# Patient Record
Sex: FEMALE | Race: WHITE | Employment: OTHER | URBAN - NONMETROPOLITAN AREA
[De-identification: names, ages, dates, MRNs, and addresses within clinical notes are randomized per-mention and may not be internally consistent; named-entity substitution may affect disease eponyms.]

---

## 2018-03-29 ENCOUNTER — HOSPITAL ENCOUNTER (OUTPATIENT)
Dept: MRI IMAGING | Age: 73
Discharge: HOME | End: 2018-03-29
Payer: MEDICARE

## 2018-03-29 DIAGNOSIS — R51.9 NONINTRACTABLE HEADACHE, UNSPECIFIED CHRONICITY PATTERN, UNSPECIFIED HEADACHE TYPE: ICD-10-CM

## 2018-03-29 PROCEDURE — 70551 MRI BRAIN STEM W/O DYE: CPT

## 2020-07-21 ENCOUNTER — HOSPITAL ENCOUNTER (OUTPATIENT)
Age: 75
Discharge: HOME | End: 2020-07-21
Payer: MEDICARE

## 2020-07-21 PROCEDURE — 36415 COLL VENOUS BLD VENIPUNCTURE: CPT

## 2020-07-21 PROCEDURE — 86769 SARS-COV-2 COVID-19 ANTIBODY: CPT

## 2020-07-21 PROCEDURE — U0003 INFECTIOUS AGENT DETECTION BY NUCLEIC ACID (DNA OR RNA); SEVERE ACUTE RESPIRATORY SYNDROME CORONAVIRUS 2 (SARS-COV-2) (CORONAVIRUS DISEASE [COVID-19]), AMPLIFIED PROBE TECHNIQUE, MAKING USE OF HIGH THROUGHPUT TECHNOLOGIES AS DESCRIBED BY CMS-2020-01-R: HCPCS

## 2020-07-21 PROCEDURE — 99211 OFF/OP EST MAY X REQ PHY/QHP: CPT

## 2020-07-23 LAB — SARS-COV-2, IGG: NEGATIVE

## 2020-07-24 LAB — SARS-COV-2: NOT DETECTED

## 2020-10-06 ENCOUNTER — HOSPITAL ENCOUNTER (OUTPATIENT)
Dept: MRI IMAGING | Age: 75
Discharge: HOME | End: 2020-10-06
Payer: MEDICARE

## 2020-10-06 LAB — POC CREATININE WHOLE BLOOD: 0.7 MG/DL (ref 0.5–1.2)

## 2020-10-06 PROCEDURE — 72157 MRI CHEST SPINE W/O & W/DYE: CPT

## 2020-10-06 PROCEDURE — A9579 GAD-BASE MR CONTRAST NOS,1ML: HCPCS | Performed by: NEUROLOGICAL SURGERY

## 2020-10-06 PROCEDURE — 6360000004 HC RX CONTRAST MEDICATION: Performed by: NEUROLOGICAL SURGERY

## 2020-10-06 PROCEDURE — 82565 ASSAY OF CREATININE: CPT

## 2020-10-06 PROCEDURE — 72156 MRI NECK SPINE W/O & W/DYE: CPT

## 2020-10-06 RX ADMIN — GADOTERIDOL 15 ML: 279.3 INJECTION, SOLUTION INTRAVENOUS at 13:42

## 2021-01-17 ENCOUNTER — HOSPITAL ENCOUNTER (EMERGENCY)
Age: 76
Discharge: HOME | End: 2021-01-17
Payer: MEDICARE

## 2021-01-17 VITALS
DIASTOLIC BLOOD PRESSURE: 69 MMHG | RESPIRATION RATE: 16 BRPM | WEIGHT: 165 LBS | HEIGHT: 62 IN | SYSTOLIC BLOOD PRESSURE: 144 MMHG | OXYGEN SATURATION: 95 % | BODY MASS INDEX: 30.36 KG/M2 | TEMPERATURE: 97.6 F | HEART RATE: 86 BPM

## 2021-01-17 DIAGNOSIS — J06.9 VIRAL URI: Primary | ICD-10-CM

## 2021-01-17 PROCEDURE — 99213 OFFICE O/P EST LOW 20 MIN: CPT

## 2021-01-17 PROCEDURE — U0003 INFECTIOUS AGENT DETECTION BY NUCLEIC ACID (DNA OR RNA); SEVERE ACUTE RESPIRATORY SYNDROME CORONAVIRUS 2 (SARS-COV-2) (CORONAVIRUS DISEASE [COVID-19]), AMPLIFIED PROBE TECHNIQUE, MAKING USE OF HIGH THROUGHPUT TECHNOLOGIES AS DESCRIBED BY CMS-2020-01-R: HCPCS

## 2021-01-17 PROCEDURE — U0002 COVID-19 LAB TEST NON-CDC: HCPCS

## 2021-01-17 RX ORDER — CALCIUM POLYCARBOPHIL 625 MG 625 MG/1
625 TABLET ORAL DAILY
COMMUNITY

## 2021-01-17 RX ORDER — ONDANSETRON 4 MG/1
4 TABLET, ORALLY DISINTEGRATING ORAL EVERY 8 HOURS PRN
Qty: 21 TABLET | Refills: 0 | Status: SHIPPED | OUTPATIENT
Start: 2021-01-17

## 2021-01-17 NOTE — ED PROVIDER NOTES
[address]       Chief Complaint   Patient presents with    Covid Testing    Nausea    Fever     99.9    Fatigue       Nurses Notes reviewed and I agree except as noted in the HPI. HISTORY OF PRESENT ILLNESS   Gene Wilde is a 77 y.o. female who presents The history is provided by the patient. URI  Presenting symptoms: congestion, cough, fatigue, fever and sore throat    Congestion:     Location:  Chest    Interferes with sleep: yes      Interferes with eating/drinking: yes    Cough:     Cough characteristics:  Non-productive, barking, hacking and harsh    Sputum characteristics:  Nondescript    Severity:  Moderate    Onset quality:  Sudden    Timing:  Intermittent    Progression:  Waxing and waning  Fatigue:     Severity:  Moderate    Timing:  Constant    Progression:  Worsening  Fever:     Duration:  1 day    Timing:  Intermittent    Max temp prior to arrival:  99.8    Temp source:  Subjective and oral  Severity:  Moderate  Onset quality:  Sudden  Timing:  Intermittent  Progression:  Worsening  Chronicity:  New  Relieved by:  Nothing  Worsened by:  Drinking, eating and movement  Ineffective treatments:  Rest, OTC medications and drinking  Associated symptoms: headaches, myalgias and sinus pain    Risk factors: being elderly, chronic respiratory disease, recent illness and sick contacts          REVIEW OF SYSTEMS     Review of Systems   Constitutional: Positive for activity change, appetite change, fatigue and fever. HENT: Positive for congestion, sinus pressure, sinus pain and sore throat. Eyes: Negative. Respiratory: Positive for cough and chest tightness. Cardiovascular: Negative. Gastrointestinal: Negative. Endocrine: Negative for cold intolerance and heat intolerance. Genitourinary: Negative. Musculoskeletal: Positive for back pain and myalgias. Allergic/Immunologic: Negative.     Neurological: Positive for headaches. Hematological: Negative for adenopathy. Does not bruise/bleed easily. Psychiatric/Behavioral: Negative. PAST MEDICAL HISTORY         Diagnosis Date    [facility])     skin    Cerebral artery occlusion with cerebral infarction ([facility] [address]     GERD (gastroesophageal reflux disease)     Hypertension     Irritable bowel disease     PONV (postoperative nausea and vomiting)     nausea       SURGICAL HISTORY     Patient  has a past surgical history that includes cervical fusion (2000); Colonoscopy (2013); Hemorrhoid surgery (1995?? ); Pilonidal cyst excision (1965); Tonsillectomy; eye surgery (2011); skin biopsy; Dilation and curettage of uterus; Tympanostomy tube placement (Left); and LEEP (04/20/2015). CURRENT MEDICATIONS       Discharge Medication List as of 1/17/2021  4:47 PM      CONTINUE these medications which have NOT CHANGED    Details   Ezetimibe (ZETIA PO) Take by mouthHistorical Med      polycarbophil (FIBERCON) 625 MG tablet Take 625 mg by mouth dailyHistorical Med      Rosuvastatin Calcium (CRESTOR PO) Take by mouthHistorical Med      METFORMIN HCL PO Take by mouthHistorical Med      vitamin D (CHOLECALCIFEROL) 1000 UNIT TABS tablet Take 1,000 Units by mouth dailyHistorical Med      folic acid (FOLVITE) 1 MG tablet Take 1 mg by mouth dailyHistorical Med      losartan (COZAAR) 100 MG tablet Take 100 mg by mouth dailyHistorical Med      loperamide (IMODIUM) 2 MG capsule Take 2 mg by mouth 4 times daily as needed for DiarrheaHistorical Med      hydrochlorothiazide (HYDRODIURIL) 25 MG tablet Take 25 mg by mouth daily. RABEprazole (ACIPHEX) 20 MG tablet Take 20 mg by mouth daily. ALPRAZolam (XANAX) 0.25 MG tablet Take 0.25 mg by mouth nightly as needed for Sleep. ALLERGIES     Patient is is allergic to pcn [penicillins] and versed [midazolam]. FAMILY HISTORY     Patient'sfamily history is not on file.     SOCIAL HISTORY     Patient  reports that she has never smoked. She has never used smokeless tobacco. She reports current alcohol use. She reports that she does not use drugs. PHYSICAL EXAM     ED TRIAGE VITALS  BP: (!) 144/69, Temp: 97.6 °F (36.4 °C), Pulse: 86, Resp: 16, SpO2: 95 %  Physical Exam  Vitals signs and nursing note reviewed. Constitutional:       Appearance: She is obese. She is ill-appearing. HENT:      Head: Normocephalic. Right Ear: Tympanic membrane, ear canal and external ear normal.      Left Ear: Tympanic membrane, ear canal and external ear normal.      Nose: Nose normal.      Mouth/Throat:      Mouth: Mucous membranes are dry. Pharynx: Posterior oropharyngeal erythema present. Eyes:      Conjunctiva/sclera: Conjunctivae normal.   Neck:      Musculoskeletal: Normal range of motion and neck supple. Muscular tenderness present. Cardiovascular:      Rate and Rhythm: Normal rate and regular rhythm. Heart sounds: Normal heart sounds. Pulmonary:      Breath sounds: Wheezing (diffuse throughout) present. No rhonchi. Abdominal:      General: Abdomen is flat. Bowel sounds are normal.   Musculoskeletal: Normal range of motion. Lymphadenopathy:      Cervical: No cervical adenopathy. Skin:     General: Skin is warm and dry. Capillary Refill: Capillary refill takes 2 to 3 seconds. Coloration: Skin is pale. Neurological:      General: No focal deficit present. Mental Status: She is alert and oriented to person, place, and time. Mental status is at baseline. Psychiatric:         Mood and Affect: Mood normal.         Behavior: Behavior normal.         Thought Content: Thought content normal.         DIAGNOSTIC RESULTS   Labs: No results found for this visit on 01/17/21.     IMAGING:  No orders to display     URGENT CARE COURSE:     Vitals:    01/17/21 1630   BP: (!) 144/69   Pulse: 86   Resp: 16   Temp: 97.6 °F (36.4 °C)   TempSrc: Temporal   SpO2: 95%   Weight: 165 lb (74.8 kg)   Height: 5' 2\" (1.575 m) Medications - No data to display  PROCEDURES:  None  FINALIMPRESSION    I have reviewed the patient's medical history in detail and updated the computerized patient record. HPI/ROS per the patient and caregiver. Overall non toxic in appearance. Answers questions appropriately. Conditions discussed and addressed this visit include:     Symptoms consistent with a viral URI. Does have a known close contact positive for covid in the last 5 days. Steps to help prevent the spread of COVID-19 if you are sick  SOURCE - [email or web address]/. html     Stay home except to get medical care   ; Stay home: People who are mildly ill with COVID-19 are able to isolate at home during their illness. You should restrict activities outside your home, except for getting medical care.   ; Avoid public areas: Do not go to work, school, or public areas.   ; Avoid public transportation: Avoid using public transportation, ride-sharing, or taxis.  ; Separate yourself from other people and animals in your home   ; Stay away from others: As much as possible, you should stay in a specific room and away from other people in your home. Also, you should use a separate bathroom, if available.   ; Limit contact with pets & animals: You should restrict contact with pets and other animals while you are sick with COVID-19, just like you would around other people. Although there have not been reports of pets or other animals becoming sick with COVID-19, it is still recommended that people sick with COVID-19 limit contact with animals until more information is known about the virus. ; When possible, have another member of your household care for your animals while you are sick. If you are sick with COVID-19, avoid contact with your pet, including petting, snuggling, being kissed or licked, and sharing food.  If you must care for your pet or be around animals while you are sick, wash your hands before product, such as wearing gloves and making sure you have good ventilation during use of the product. Monitor your symptoms   Seek medical attention: Seek prompt medical attention if your illness is worsening     (e.g., difficulty breathing).   ; Call your doctor: Before seeking care, call your healthcare provider and tell them that you have, or are being evaluated for, COVID-19.   ; Wear a facemask when sick: Put on a facemask before you enter the facility. These steps will help the healthcare provider's office to keep other people in the office or waiting room from getting infected or exposed. ; Alert health department: Ask your healthcare provider to call the local or [facility] health department. Persons who are placed under active monitoring or facilitated self-monitoring should follow instructions provided by their local health department or occupational health professionals, as appropriate.  ; Call 911 if you have a medical emergency: If you have a medical emergency and need to call 911, notify the dispatch personnel that you have, or are being evaluated for COVID-19. If possible, put on a facemask before emergency medical services arrive.       1. Viral URI        DISPOSITION/PLAN   DISPOSITION      PATIENT REFERRED TO:  Celine Soto MD  [address] [place] [address]  [phone]    In 3 days  As needed, If symptoms worsen    Celine Soto MD  [address] [place] [address]  [phone]    In 3 days  As needed, If symptoms worsen    DISCHARGE MEDICATIONS:  Discharge Medication List as of 1/17/2021  4:47 PM      START taking these medications    Details   ondansetron (ZOFRAN ODT) 4 MG disintegrating tablet Take 1 tablet by mouth every 8 hours as needed for Nausea or Vomiting, Disp-21 tablet, R-0Normal           Discharge Medication List as of 1/17/2021  4:47 PM          DINA Arias - CNP

## 2021-01-17 NOTE — ED NOTES
To [facility] with complaints of needing tested for covid. States her daughter recently tested positive and she was around her last Saturday. Symptoms of fatigue, fever up to 99.9, nausea.  Had diarrhea last Monday but none since     [facility]  01/17/21 0161

## 2021-01-18 ENCOUNTER — CARE COORDINATION (OUTPATIENT)
Dept: CARE COORDINATION | Age: 76
End: 2021-01-18

## 2021-01-18 ASSESSMENT — ENCOUNTER SYMPTOMS
EYES NEGATIVE: 1
SINUS PRESSURE: 1
ALLERGIC/IMMUNOLOGIC NEGATIVE: 1
SINUS PAIN: 1
GASTROINTESTINAL NEGATIVE: 1
SORE THROAT: 1
COUGH: 1
CHEST TIGHTNESS: 1
BACK PAIN: 1

## 2021-01-18 NOTE — CARE COORDINATION
Called unable to leave message due to mailbox being full. Will Attempt second call This after noon. Return call from patient     Patient contacted regarding Lois Primer. Discussed COVID-19 related testing which was pending at this time. Test results were pending. Patient informed of results, if available? Yes    Care Transition Nurse/ Ambulatory Care Manager contacted the parent by telephone to perform post discharge assessment. Call within 2 business days of discharge: Yes. Verified name and  with parent as identifiers. Provided introduction to self, and explanation of the CTN/ACM role, and reason for call due to risk factors for infection and/or exposure to COVID-19. Symptoms reviewed with parent who verbalized the following symptoms: fever, fatigue, pain or aching joints, cough, nausea and sore throat. Due to no new or worsening symptoms encounter was not routed to provider for escalation. Discussed follow-up appointments. If no appointment was previously scheduled, appointment scheduling offered: Yes. Patient will call PCP and make appointment  [facility] follow up appointment(s): No future appointments. Non-[facility] follow up appointment(s):     Non-face-to-face services provided:  Obtained and reviewed discharge summary and/or continuity of care documents  Reviewed and followed up on pending diagnostic tests and treatments- COVID Pending  Education of patient/family/caregiver/guardian to support self-management- Isolation precautions     Advance Care Planning:   Does patient have an Advance Directive:  not on file; education provided. Patient has following risk factors of: no known risk factors. CTN/ACM reviewed discharge instructions, medical action plan and red flags such as increased shortness of breath, increasing fever and signs of decompensation with parent who verbalized understanding.    Discussed exposure protocols and quarantine with CDC Guidelines What to do if you are sick with

## 2021-01-19 LAB
PERFORMING LAB: NORMAL
REPORT: NORMAL
SARS-COV-2: NOT DETECTED

## 2025-04-18 ENCOUNTER — HOSPITAL ENCOUNTER (INPATIENT)
Age: 80
LOS: 1 days | Discharge: HOME | DRG: 322 | End: 2025-04-19
Attending: EMERGENCY MEDICINE | Admitting: STUDENT IN AN ORGANIZED HEALTH CARE EDUCATION/TRAINING PROGRAM
Payer: MEDICARE

## 2025-04-18 ENCOUNTER — APPOINTMENT (OUTPATIENT)
Dept: CT IMAGING | Age: 80
DRG: 322 | End: 2025-04-18
Payer: MEDICARE

## 2025-04-18 DIAGNOSIS — I16.0 HYPERTENSIVE URGENCY: ICD-10-CM

## 2025-04-18 DIAGNOSIS — R79.89 ELEVATED TROPONIN: ICD-10-CM

## 2025-04-18 DIAGNOSIS — I21.4 NSTEMI (NON-ST ELEVATED MYOCARDIAL INFARCTION) (HCC): ICD-10-CM

## 2025-04-18 DIAGNOSIS — I21.3 STEMI (ST ELEVATION MYOCARDIAL INFARCTION) (HCC): ICD-10-CM

## 2025-04-18 DIAGNOSIS — Z86.73 HX OF COMPLETED STROKE: Primary | ICD-10-CM

## 2025-04-18 PROBLEM — K52.1 TOXIC GASTROENTERITIS AND COLITIS: Status: ACTIVE | Noted: 2021-04-08

## 2025-04-18 PROBLEM — E78.5 HLD (HYPERLIPIDEMIA): Status: ACTIVE | Noted: 2025-04-18

## 2025-04-18 PROBLEM — N39.0 URINARY TRACT INFECTION: Status: RESOLVED | Noted: 2025-04-18 | Resolved: 2025-04-18

## 2025-04-18 PROBLEM — E66.811 OBESITY (BMI 30.0-34.9): Status: ACTIVE | Noted: 2020-11-17

## 2025-04-18 PROBLEM — M85.80 OSTEOPENIA: Status: RESOLVED | Noted: 2025-04-18 | Resolved: 2025-04-18

## 2025-04-18 PROBLEM — E11.9 TYPE 2 DIABETES MELLITUS WITHOUT COMPLICATIONS (HCC): Status: ACTIVE | Noted: 2020-12-03

## 2025-04-18 PROBLEM — I63.9 CEREBROVASCULAR ACCIDENT (CVA) (HCC): Status: RESOLVED | Noted: 2020-12-22 | Resolved: 2025-04-18

## 2025-04-18 PROBLEM — I25.2 HISTORY OF NON-ST ELEVATION MYOCARDIAL INFARCTION (NSTEMI): Status: ACTIVE | Noted: 2025-04-18

## 2025-04-18 PROBLEM — F41.1 GENERALIZED ANXIETY DISORDER: Status: ACTIVE | Noted: 2024-04-03

## 2025-04-18 PROBLEM — N89.1 VAIN II (VAGINAL INTRAEPITHELIAL NEOPLASIA GRADE II): Status: RESOLVED | Noted: 2018-07-19 | Resolved: 2025-04-18

## 2025-04-18 PROBLEM — I10 HYPERTENSION: Status: ACTIVE | Noted: 2021-04-08

## 2025-04-18 PROBLEM — Z87.898 HISTORY OF BRADYCARDIA: Status: ACTIVE | Noted: 2025-04-18

## 2025-04-18 LAB
ACTIVATED CLOTTING TIME: 354 SECONDS (ref 1–150)
ALBUMIN SERPL BCG-MCNC: 4 G/DL (ref 3.4–4.9)
ALP SERPL-CCNC: 58 U/L (ref 38–126)
ALT SERPL W/O P-5'-P-CCNC: 37 U/L (ref 10–35)
ANION GAP SERPL CALC-SCNC: 14 MEQ/L (ref 8–16)
APTT PPP: 36.7 SECONDS (ref 22–38)
APTT PPP: 37.9 SECONDS (ref 22–38)
AST SERPL-CCNC: 43 U/L (ref 10–35)
BASOPHILS ABSOLUTE: 0 THOU/MM3 (ref 0–0.1)
BASOPHILS NFR BLD AUTO: 0.2 %
BILIRUB SERPL-MCNC: 0.9 MG/DL (ref 0.3–1.2)
BUN SERPL-MCNC: 14 MG/DL (ref 8–23)
CALCIUM SERPL-MCNC: 9.5 MG/DL (ref 8.8–10.2)
CHLORIDE SERPL-SCNC: 102 MEQ/L (ref 98–111)
CK SERPL-CCNC: 126 U/L (ref 26–192)
CO2 SERPL-SCNC: 21 MEQ/L (ref 22–29)
CREAT SERPL-MCNC: 0.8 MG/DL (ref 0.5–0.9)
DEPRECATED MEAN GLUCOSE BLD GHB EST-ACNC: 141 MG/DL (ref 70–126)
DEPRECATED RDW RBC AUTO: 44.2 FL (ref 35–45)
ECHO BSA: 1.8 M2
EKG ATRIAL RATE: 70 BPM
EKG ATRIAL RATE: 75 BPM
EKG ATRIAL RATE: 83 BPM
EKG P AXIS: 32 DEGREES
EKG P AXIS: 47 DEGREES
EKG P-R INTERVAL: 116 MS
EKG P-R INTERVAL: 142 MS
EKG P-R INTERVAL: 144 MS
EKG Q-T INTERVAL: 406 MS
EKG Q-T INTERVAL: 478 MS
EKG Q-T INTERVAL: 516 MS
EKG QRS DURATION: 138 MS
EKG QRS DURATION: 140 MS
EKG QRS DURATION: 146 MS
EKG QTC CALCULATION (BAZETT): 502 MS
EKG QTC CALCULATION (BAZETT): 533 MS
EKG QTC CALCULATION (BAZETT): 557 MS
EKG R AXIS: -41 DEGREES
EKG R AXIS: -55 DEGREES
EKG R AXIS: -58 DEGREES
EKG T AXIS: -36 DEGREES
EKG T AXIS: -7 DEGREES
EKG T AXIS: 53 DEGREES
EKG VENTRICULAR RATE: 70 BPM
EKG VENTRICULAR RATE: 75 BPM
EKG VENTRICULAR RATE: 92 BPM
EOSINOPHIL NFR BLD AUTO: 1.3 %
EOSINOPHILS ABSOLUTE: 0.1 THOU/MM3 (ref 0–0.4)
ERYTHROCYTE [DISTWIDTH] IN BLOOD BY AUTOMATED COUNT: 13.4 % (ref 11.5–14.5)
GFR SERPL CREATININE-BSD FRML MDRD: 75 ML/MIN/1.73M2
GLUCOSE BLD STRIP.AUTO-MCNC: 152 MG/DL (ref 70–108)
GLUCOSE SERPL-MCNC: 135 MG/DL (ref 74–109)
HBA1C MFR BLD HPLC: 6.7 % (ref 4–6)
HCT VFR BLD AUTO: 44.9 % (ref 37–47)
HEPARIN UNFRACTIONATED: 0.04 U/ML (ref 0.3–0.7)
HGB BLD-MCNC: 15.6 GM/DL (ref 12–16)
IMM GRANULOCYTES # BLD AUTO: 0.02 THOU/MM3 (ref 0–0.07)
IMM GRANULOCYTES NFR BLD AUTO: 0.2 %
INR PPP: 1.42 (ref 0.85–1.13)
INR PPP: 1.43 (ref 0.85–1.13)
LIPASE SERPL-CCNC: 31 U/L (ref 13–60)
LYMPHOCYTES ABSOLUTE: 2.5 THOU/MM3 (ref 1–4.8)
LYMPHOCYTES NFR BLD AUTO: 25.3 %
MAGNESIUM SERPL-MCNC: 1.8 MG/DL (ref 1.6–2.6)
MCH RBC QN AUTO: 31.4 PG (ref 26–33)
MCHC RBC AUTO-ENTMCNC: 34.7 GM/DL (ref 32.2–35.5)
MCV RBC AUTO: 90.3 FL (ref 81–99)
MONOCYTES ABSOLUTE: 0.7 THOU/MM3 (ref 0.4–1.3)
MONOCYTES NFR BLD AUTO: 7.2 %
NEUTROPHILS ABSOLUTE: 6.4 THOU/MM3 (ref 1.8–7.7)
NEUTROPHILS NFR BLD AUTO: 65.8 %
NRBC BLD AUTO-RTO: 0 /100 WBC
NT-PROBNP SERPL IA-MCNC: 131 PG/ML (ref 0–449)
OSMOLALITY SERPL CALC.SUM OF ELEC: 276.3 MOSMOL/KG (ref 275–300)
PLATELET # BLD AUTO: 169 THOU/MM3 (ref 130–400)
PMV BLD AUTO: 10.4 FL (ref 9.4–12.4)
POTASSIUM SERPL-SCNC: 3.8 MEQ/L (ref 3.5–5.2)
PROT SERPL-MCNC: 7.2 G/DL (ref 6.4–8.3)
RBC # BLD AUTO: 4.97 MILL/MM3 (ref 4.2–5.4)
REASON FOR REJECTION: NORMAL
REJECTED TEST: NORMAL
SODIUM SERPL-SCNC: 137 MEQ/L (ref 135–145)
TROPONIN, HIGH SENSITIVITY: 34 NG/L (ref 0–12)
TROPONIN, HIGH SENSITIVITY: 548 NG/L (ref 0–12)
TROPONIN, HIGH SENSITIVITY: 833 NG/L (ref 0–12)
TSH SERPL DL<=0.05 MIU/L-ACNC: 1.98 UIU/ML (ref 0.27–4.2)
WBC # BLD AUTO: 9.8 THOU/MM3 (ref 4.8–10.8)

## 2025-04-18 PROCEDURE — 99223 1ST HOSP IP/OBS HIGH 75: CPT

## 2025-04-18 PROCEDURE — 6360000004 HC RX CONTRAST MEDICATION: Performed by: EMERGENCY MEDICINE

## 2025-04-18 PROCEDURE — 6370000000 HC RX 637 (ALT 250 FOR IP): Performed by: INTERNAL MEDICINE

## 2025-04-18 PROCEDURE — 93005 ELECTROCARDIOGRAM TRACING: CPT | Performed by: INTERNAL MEDICINE

## 2025-04-18 PROCEDURE — 6370000000 HC RX 637 (ALT 250 FOR IP)

## 2025-04-18 PROCEDURE — 93458 L HRT ARTERY/VENTRICLE ANGIO: CPT | Performed by: INTERNAL MEDICINE

## 2025-04-18 PROCEDURE — 83880 ASSAY OF NATRIURETIC PEPTIDE: CPT

## 2025-04-18 PROCEDURE — 99152 MOD SED SAME PHYS/QHP 5/>YRS: CPT | Performed by: INTERNAL MEDICINE

## 2025-04-18 PROCEDURE — 6360000002 HC RX W HCPCS: Performed by: EMERGENCY MEDICINE

## 2025-04-18 PROCEDURE — C9600 PERC DRUG-EL COR STENT SING: HCPCS | Performed by: INTERNAL MEDICINE

## 2025-04-18 PROCEDURE — B2161ZZ FLUOROSCOPY OF RIGHT AND LEFT HEART USING LOW OSMOLAR CONTRAST: ICD-10-PCS | Performed by: INTERNAL MEDICINE

## 2025-04-18 PROCEDURE — 99285 EMERGENCY DEPT VISIT HI MDM: CPT

## 2025-04-18 PROCEDURE — 6360000002 HC RX W HCPCS

## 2025-04-18 PROCEDURE — 96365 THER/PROPH/DIAG IV INF INIT: CPT

## 2025-04-18 PROCEDURE — B3101ZZ FLUOROSCOPY OF THORACIC AORTA USING LOW OSMOLAR CONTRAST: ICD-10-PCS | Performed by: INTERNAL MEDICINE

## 2025-04-18 PROCEDURE — G0378 HOSPITAL OBSERVATION PER HR: HCPCS

## 2025-04-18 PROCEDURE — C1894 INTRO/SHEATH, NON-LASER: HCPCS | Performed by: INTERNAL MEDICINE

## 2025-04-18 PROCEDURE — 2709999900 HC NON-CHARGEABLE SUPPLY: Performed by: INTERNAL MEDICINE

## 2025-04-18 PROCEDURE — 92921 HC PRQ CARDIAC ANGIO ADDL ART: CPT | Performed by: INTERNAL MEDICINE

## 2025-04-18 PROCEDURE — 85025 COMPLETE CBC W/AUTO DIFF WBC: CPT

## 2025-04-18 PROCEDURE — 36415 COLL VENOUS BLD VENIPUNCTURE: CPT

## 2025-04-18 PROCEDURE — 82948 REAGENT STRIP/BLOOD GLUCOSE: CPT

## 2025-04-18 PROCEDURE — 85610 PROTHROMBIN TIME: CPT

## 2025-04-18 PROCEDURE — 83874 ASSAY OF MYOGLOBIN: CPT

## 2025-04-18 PROCEDURE — 84443 ASSAY THYROID STIM HORMONE: CPT

## 2025-04-18 PROCEDURE — 99153 MOD SED SAME PHYS/QHP EA: CPT | Performed by: INTERNAL MEDICINE

## 2025-04-18 PROCEDURE — 92978 ENDOLUMINL IVUS OCT C 1ST: CPT | Performed by: INTERNAL MEDICINE

## 2025-04-18 PROCEDURE — 027034Z DILATION OF CORONARY ARTERY, ONE ARTERY WITH DRUG-ELUTING INTRALUMINAL DEVICE, PERCUTANEOUS APPROACH: ICD-10-PCS | Performed by: INTERNAL MEDICINE

## 2025-04-18 PROCEDURE — 71275 CT ANGIOGRAPHY CHEST: CPT

## 2025-04-18 PROCEDURE — 4A023N7 MEASUREMENT OF CARDIAC SAMPLING AND PRESSURE, LEFT HEART, PERCUTANEOUS APPROACH: ICD-10-PCS | Performed by: INTERNAL MEDICINE

## 2025-04-18 PROCEDURE — 83036 HEMOGLOBIN GLYCOSYLATED A1C: CPT

## 2025-04-18 PROCEDURE — C1725 CATH, TRANSLUMIN NON-LASER: HCPCS | Performed by: INTERNAL MEDICINE

## 2025-04-18 PROCEDURE — 85730 THROMBOPLASTIN TIME PARTIAL: CPT

## 2025-04-18 PROCEDURE — 2500000003 HC RX 250 WO HCPCS: Performed by: INTERNAL MEDICINE

## 2025-04-18 PROCEDURE — 82550 ASSAY OF CK (CPK): CPT

## 2025-04-18 PROCEDURE — 84484 ASSAY OF TROPONIN QUANT: CPT

## 2025-04-18 PROCEDURE — 93005 ELECTROCARDIOGRAM TRACING: CPT | Performed by: EMERGENCY MEDICINE

## 2025-04-18 PROCEDURE — 85520 HEPARIN ASSAY: CPT

## 2025-04-18 PROCEDURE — 83735 ASSAY OF MAGNESIUM: CPT

## 2025-04-18 PROCEDURE — 6360000004 HC RX CONTRAST MEDICATION: Performed by: INTERNAL MEDICINE

## 2025-04-18 PROCEDURE — 70450 CT HEAD/BRAIN W/O DYE: CPT

## 2025-04-18 PROCEDURE — 93005 ELECTROCARDIOGRAM TRACING: CPT

## 2025-04-18 PROCEDURE — 85347 COAGULATION TIME ACTIVATED: CPT

## 2025-04-18 PROCEDURE — C1874 STENT, COATED/COV W/DEL SYS: HCPCS | Performed by: INTERNAL MEDICINE

## 2025-04-18 PROCEDURE — B2151ZZ FLUOROSCOPY OF LEFT HEART USING LOW OSMOLAR CONTRAST: ICD-10-PCS | Performed by: INTERNAL MEDICINE

## 2025-04-18 PROCEDURE — 96366 THER/PROPH/DIAG IV INF ADDON: CPT

## 2025-04-18 PROCEDURE — 93567 NJX CAR CTH SPRVLV AORTGRPHY: CPT | Performed by: INTERNAL MEDICINE

## 2025-04-18 PROCEDURE — C1887 CATHETER, GUIDING: HCPCS | Performed by: INTERNAL MEDICINE

## 2025-04-18 PROCEDURE — 83690 ASSAY OF LIPASE: CPT

## 2025-04-18 PROCEDURE — C1769 GUIDE WIRE: HCPCS | Performed by: INTERNAL MEDICINE

## 2025-04-18 PROCEDURE — 80053 COMPREHEN METABOLIC PANEL: CPT

## 2025-04-18 PROCEDURE — C1753 CATH, INTRAVAS ULTRASOUND: HCPCS | Performed by: INTERNAL MEDICINE

## 2025-04-18 PROCEDURE — 6360000002 HC RX W HCPCS: Performed by: INTERNAL MEDICINE

## 2025-04-18 DEVICE — STENT CORONARY ONYX FRONTIER RX 3.5X15 MM ZOTAROLIMUS ELUT: Type: IMPLANTABLE DEVICE | Status: FUNCTIONAL

## 2025-04-18 RX ORDER — DULAGLUTIDE 0.75 MG/.5ML
3 INJECTION, SOLUTION SUBCUTANEOUS WEEKLY
Status: ON HOLD | COMMUNITY
End: 2025-04-19 | Stop reason: HOSPADM

## 2025-04-18 RX ORDER — ONDANSETRON 4 MG/1
4 TABLET, ORALLY DISINTEGRATING ORAL EVERY 8 HOURS PRN
Status: DISCONTINUED | OUTPATIENT
Start: 2025-04-18 | End: 2025-04-18

## 2025-04-18 RX ORDER — ALPRAZOLAM 0.25 MG
0.25 TABLET ORAL 2 TIMES DAILY PRN
Status: DISCONTINUED | OUTPATIENT
Start: 2025-04-18 | End: 2025-04-19 | Stop reason: HOSPADM

## 2025-04-18 RX ORDER — ASPIRIN 81 MG/1
81 TABLET, CHEWABLE ORAL DAILY
Status: DISCONTINUED | OUTPATIENT
Start: 2025-04-19 | End: 2025-04-18

## 2025-04-18 RX ORDER — ONDANSETRON 2 MG/ML
4 INJECTION INTRAMUSCULAR; INTRAVENOUS EVERY 6 HOURS PRN
Status: DISCONTINUED | OUTPATIENT
Start: 2025-04-18 | End: 2025-04-19 | Stop reason: HOSPADM

## 2025-04-18 RX ORDER — POLYETHYLENE GLYCOL 3350 17 G/17G
17 POWDER, FOR SOLUTION ORAL DAILY PRN
Status: DISCONTINUED | OUTPATIENT
Start: 2025-04-18 | End: 2025-04-19 | Stop reason: HOSPADM

## 2025-04-18 RX ORDER — NITROGLYCERIN 20 MG/100ML
INJECTION INTRAVENOUS
Status: DISPENSED
Start: 2025-04-18 | End: 2025-04-19

## 2025-04-18 RX ORDER — SODIUM CHLORIDE 0.9 % (FLUSH) 0.9 %
5-40 SYRINGE (ML) INJECTION EVERY 12 HOURS SCHEDULED
Status: DISCONTINUED | OUTPATIENT
Start: 2025-04-18 | End: 2025-04-19 | Stop reason: HOSPADM

## 2025-04-18 RX ORDER — ACETAMINOPHEN 325 MG/1
650 TABLET ORAL EVERY 6 HOURS PRN
Status: DISCONTINUED | OUTPATIENT
Start: 2025-04-18 | End: 2025-04-18 | Stop reason: DRUGHIGH

## 2025-04-18 RX ORDER — IOPAMIDOL 755 MG/ML
INJECTION, SOLUTION INTRAVASCULAR PRN
Status: DISCONTINUED | OUTPATIENT
Start: 2025-04-18 | End: 2025-04-18 | Stop reason: HOSPADM

## 2025-04-18 RX ORDER — SODIUM CHLORIDE 9 MG/ML
INJECTION, SOLUTION INTRAVENOUS CONTINUOUS
Status: DISCONTINUED | OUTPATIENT
Start: 2025-04-18 | End: 2025-04-19

## 2025-04-18 RX ORDER — HYDRALAZINE HYDROCHLORIDE 10 MG/1
10 TABLET, FILM COATED ORAL 2 TIMES DAILY
Status: DISCONTINUED | OUTPATIENT
Start: 2025-04-18 | End: 2025-04-19

## 2025-04-18 RX ORDER — INSULIN LISPRO 100 [IU]/ML
0-8 INJECTION, SOLUTION INTRAVENOUS; SUBCUTANEOUS
Status: DISCONTINUED | OUTPATIENT
Start: 2025-04-18 | End: 2025-04-19 | Stop reason: HOSPADM

## 2025-04-18 RX ORDER — HEPARIN SODIUM 1000 [USP'U]/ML
2000 INJECTION, SOLUTION INTRAVENOUS; SUBCUTANEOUS PRN
Status: DISCONTINUED | OUTPATIENT
Start: 2025-04-18 | End: 2025-04-18

## 2025-04-18 RX ORDER — CARVEDILOL 3.12 MG/1
3.12 TABLET ORAL 2 TIMES DAILY WITH MEALS
Status: DISCONTINUED | OUTPATIENT
Start: 2025-04-18 | End: 2025-04-19 | Stop reason: HOSPADM

## 2025-04-18 RX ORDER — TROSPIUM CHLORIDE 20 MG/1
20 TABLET, FILM COATED ORAL
Status: DISCONTINUED | OUTPATIENT
Start: 2025-04-19 | End: 2025-04-19 | Stop reason: HOSPADM

## 2025-04-18 RX ORDER — HYDRALAZINE HYDROCHLORIDE 20 MG/ML
10 INJECTION INTRAMUSCULAR; INTRAVENOUS ONCE
Status: DISCONTINUED | OUTPATIENT
Start: 2025-04-18 | End: 2025-04-18

## 2025-04-18 RX ORDER — FOLIC ACID 1 MG/1
1 TABLET ORAL DAILY
Status: DISCONTINUED | OUTPATIENT
Start: 2025-04-19 | End: 2025-04-19 | Stop reason: HOSPADM

## 2025-04-18 RX ORDER — NITROGLYCERIN 20 MG/100ML
5-200 INJECTION INTRAVENOUS CONTINUOUS
Status: DISCONTINUED | OUTPATIENT
Start: 2025-04-18 | End: 2025-04-19

## 2025-04-18 RX ORDER — SODIUM CHLORIDE 0.9 % (FLUSH) 0.9 %
5-40 SYRINGE (ML) INJECTION EVERY 12 HOURS SCHEDULED
Status: DISCONTINUED | OUTPATIENT
Start: 2025-04-18 | End: 2025-04-19 | Stop reason: SDUPTHER

## 2025-04-18 RX ORDER — HEPARIN SODIUM 1000 [USP'U]/ML
4000 INJECTION, SOLUTION INTRAVENOUS; SUBCUTANEOUS ONCE
Status: COMPLETED | OUTPATIENT
Start: 2025-04-18 | End: 2025-04-18

## 2025-04-18 RX ORDER — FENTANYL CITRATE 50 UG/ML
INJECTION, SOLUTION INTRAMUSCULAR; INTRAVENOUS PRN
Status: DISCONTINUED | OUTPATIENT
Start: 2025-04-18 | End: 2025-04-18 | Stop reason: HOSPADM

## 2025-04-18 RX ORDER — PHENOL 1.4 %
1 AEROSOL, SPRAY (ML) MUCOUS MEMBRANE DAILY
COMMUNITY

## 2025-04-18 RX ORDER — ASPIRIN 81 MG/1
81 TABLET ORAL DAILY
COMMUNITY

## 2025-04-18 RX ORDER — GLUCAGON 1 MG/ML
1 KIT INJECTION PRN
Status: DISCONTINUED | OUTPATIENT
Start: 2025-04-18 | End: 2025-04-19 | Stop reason: HOSPADM

## 2025-04-18 RX ORDER — SODIUM CHLORIDE 9 MG/ML
INJECTION, SOLUTION INTRAVENOUS PRN
Status: DISCONTINUED | OUTPATIENT
Start: 2025-04-18 | End: 2025-04-19 | Stop reason: HOSPADM

## 2025-04-18 RX ORDER — MAG HYDROX/ALUMINUM HYD/SIMETH 400-400-40
1 SUSPENSION, ORAL (FINAL DOSE FORM) ORAL DAILY
COMMUNITY

## 2025-04-18 RX ORDER — GLIMEPIRIDE 4 MG/1
4 TABLET ORAL 2 TIMES DAILY
COMMUNITY
Start: 2025-03-23

## 2025-04-18 RX ORDER — MIDAZOLAM HYDROCHLORIDE 1 MG/ML
INJECTION, SOLUTION INTRAMUSCULAR; INTRAVENOUS PRN
Status: DISCONTINUED | OUTPATIENT
Start: 2025-04-18 | End: 2025-04-18 | Stop reason: HOSPADM

## 2025-04-18 RX ORDER — HEPARIN SODIUM 10000 [USP'U]/100ML
5-30 INJECTION, SOLUTION INTRAVENOUS CONTINUOUS
Status: DISCONTINUED | OUTPATIENT
Start: 2025-04-18 | End: 2025-04-18

## 2025-04-18 RX ORDER — PANTOPRAZOLE SODIUM 40 MG/1
40 TABLET, DELAYED RELEASE ORAL
Status: DISCONTINUED | OUTPATIENT
Start: 2025-04-19 | End: 2025-04-19 | Stop reason: HOSPADM

## 2025-04-18 RX ORDER — SODIUM CHLORIDE 0.9 % (FLUSH) 0.9 %
5-40 SYRINGE (ML) INJECTION PRN
Status: DISCONTINUED | OUTPATIENT
Start: 2025-04-18 | End: 2025-04-19 | Stop reason: SDUPTHER

## 2025-04-18 RX ORDER — ACETAMINOPHEN 325 MG/1
650 TABLET ORAL EVERY 4 HOURS PRN
Status: DISCONTINUED | OUTPATIENT
Start: 2025-04-18 | End: 2025-04-19 | Stop reason: HOSPADM

## 2025-04-18 RX ORDER — IOPAMIDOL 755 MG/ML
80 INJECTION, SOLUTION INTRAVASCULAR
Status: COMPLETED | OUTPATIENT
Start: 2025-04-18 | End: 2025-04-18

## 2025-04-18 RX ORDER — MAGNESIUM SULFATE IN WATER 40 MG/ML
2000 INJECTION, SOLUTION INTRAVENOUS PRN
Status: DISCONTINUED | OUTPATIENT
Start: 2025-04-18 | End: 2025-04-19 | Stop reason: HOSPADM

## 2025-04-18 RX ORDER — ONDANSETRON 2 MG/ML
4 INJECTION INTRAMUSCULAR; INTRAVENOUS EVERY 6 HOURS PRN
Status: DISCONTINUED | OUTPATIENT
Start: 2025-04-18 | End: 2025-04-18

## 2025-04-18 RX ORDER — EZETIMIBE 10 MG/1
10 TABLET ORAL NIGHTLY
Status: DISCONTINUED | OUTPATIENT
Start: 2025-04-19 | End: 2025-04-19 | Stop reason: HOSPADM

## 2025-04-18 RX ORDER — MIRABEGRON 25 MG/1
25 TABLET, FILM COATED, EXTENDED RELEASE ORAL NIGHTLY
COMMUNITY
Start: 2025-02-09

## 2025-04-18 RX ORDER — SODIUM CHLORIDE 9 MG/ML
INJECTION, SOLUTION INTRAVENOUS CONTINUOUS
Status: DISCONTINUED | OUTPATIENT
Start: 2025-04-18 | End: 2025-04-18 | Stop reason: DRUGHIGH

## 2025-04-18 RX ORDER — HEPARIN SODIUM 1000 [USP'U]/ML
INJECTION, SOLUTION INTRAVENOUS; SUBCUTANEOUS PRN
Status: DISCONTINUED | OUTPATIENT
Start: 2025-04-18 | End: 2025-04-18 | Stop reason: HOSPADM

## 2025-04-18 RX ORDER — HEPARIN SODIUM 1000 [USP'U]/ML
4000 INJECTION, SOLUTION INTRAVENOUS; SUBCUTANEOUS PRN
Status: DISCONTINUED | OUTPATIENT
Start: 2025-04-18 | End: 2025-04-18

## 2025-04-18 RX ORDER — LOSARTAN POTASSIUM 100 MG/1
100 TABLET ORAL DAILY
Status: DISCONTINUED | OUTPATIENT
Start: 2025-04-19 | End: 2025-04-19

## 2025-04-18 RX ORDER — ACETAMINOPHEN 650 MG/1
650 SUPPOSITORY RECTAL EVERY 6 HOURS PRN
Status: DISCONTINUED | OUTPATIENT
Start: 2025-04-18 | End: 2025-04-19 | Stop reason: HOSPADM

## 2025-04-18 RX ORDER — POTASSIUM CHLORIDE 7.45 MG/ML
10 INJECTION INTRAVENOUS PRN
Status: DISCONTINUED | OUTPATIENT
Start: 2025-04-18 | End: 2025-04-19 | Stop reason: HOSPADM

## 2025-04-18 RX ORDER — SODIUM CHLORIDE 9 MG/ML
INJECTION, SOLUTION INTRAVENOUS PRN
Status: DISCONTINUED | OUTPATIENT
Start: 2025-04-18 | End: 2025-04-19 | Stop reason: SDUPTHER

## 2025-04-18 RX ORDER — HYDROCHLOROTHIAZIDE 25 MG/1
25 TABLET ORAL DAILY
Status: DISCONTINUED | OUTPATIENT
Start: 2025-04-19 | End: 2025-04-19 | Stop reason: HOSPADM

## 2025-04-18 RX ORDER — IMIQUIMOD 12.5 MG/.25G
CREAM TOPICAL
Status: ON HOLD | COMMUNITY
End: 2025-04-19 | Stop reason: HOSPADM

## 2025-04-18 RX ORDER — ROSUVASTATIN CALCIUM 20 MG/1
40 TABLET, COATED ORAL NIGHTLY
Status: DISCONTINUED | OUTPATIENT
Start: 2025-04-18 | End: 2025-04-19 | Stop reason: HOSPADM

## 2025-04-18 RX ORDER — DEXTROSE MONOHYDRATE 100 MG/ML
INJECTION, SOLUTION INTRAVENOUS CONTINUOUS PRN
Status: DISCONTINUED | OUTPATIENT
Start: 2025-04-18 | End: 2025-04-19 | Stop reason: HOSPADM

## 2025-04-18 RX ORDER — HYDRALAZINE HYDROCHLORIDE 10 MG/1
10 TABLET, FILM COATED ORAL 2 TIMES DAILY
Status: ON HOLD | COMMUNITY
End: 2025-04-19 | Stop reason: HOSPADM

## 2025-04-18 RX ORDER — POTASSIUM CHLORIDE 1500 MG/1
40 TABLET, EXTENDED RELEASE ORAL PRN
Status: DISCONTINUED | OUTPATIENT
Start: 2025-04-18 | End: 2025-04-19 | Stop reason: HOSPADM

## 2025-04-18 RX ORDER — SODIUM CHLORIDE 0.9 % (FLUSH) 0.9 %
5-40 SYRINGE (ML) INJECTION PRN
Status: DISCONTINUED | OUTPATIENT
Start: 2025-04-18 | End: 2025-04-19 | Stop reason: HOSPADM

## 2025-04-18 RX ADMIN — HEPARIN SODIUM 12 UNITS/KG/HR: 10000 INJECTION, SOLUTION INTRAVENOUS at 17:11

## 2025-04-18 RX ADMIN — CARVEDILOL 3.12 MG: 3.12 TABLET, FILM COATED ORAL at 23:34

## 2025-04-18 RX ADMIN — HEPARIN SODIUM 4000 UNITS: 1000 INJECTION INTRAVENOUS; SUBCUTANEOUS at 17:10

## 2025-04-18 RX ADMIN — ROSUVASTATIN CALCIUM 40 MG: 20 TABLET, FILM COATED ORAL at 23:34

## 2025-04-18 RX ADMIN — IOPAMIDOL 80 ML: 755 INJECTION, SOLUTION INTRAVENOUS at 14:53

## 2025-04-18 RX ADMIN — HYDRALAZINE HYDROCHLORIDE 10 MG: 10 TABLET ORAL at 23:34

## 2025-04-18 ASSESSMENT — PAIN DESCRIPTION - ORIENTATION
ORIENTATION: LEFT
ORIENTATION: RIGHT;LEFT

## 2025-04-18 ASSESSMENT — ENCOUNTER SYMPTOMS
WHEEZING: 0
SORE THROAT: 0
NAUSEA: 1
SINUS PAIN: 0
ABDOMINAL PAIN: 0
RHINORRHEA: 0
SINUS PRESSURE: 0
BACK PAIN: 0
COLOR CHANGE: 0
DIARRHEA: 0
COUGH: 0
SHORTNESS OF BREATH: 0
CONSTIPATION: 0
VOMITING: 0

## 2025-04-18 ASSESSMENT — PAIN DESCRIPTION - DESCRIPTORS
DESCRIPTORS: ACHING

## 2025-04-18 ASSESSMENT — PAIN SCALES - GENERAL
PAINLEVEL_OUTOF10: 7
PAINLEVEL_OUTOF10: 8
PAINLEVEL_OUTOF10: 1
PAINLEVEL_OUTOF10: 3

## 2025-04-18 ASSESSMENT — PAIN DESCRIPTION - PAIN TYPE: TYPE: ACUTE PAIN

## 2025-04-18 ASSESSMENT — PAIN DESCRIPTION - LOCATION
LOCATION: CHEST;ARM
LOCATION: CHEST;ARM;BACK
LOCATION: ARM
LOCATION: CHEST

## 2025-04-18 ASSESSMENT — HEART SCORE: ECG: NON-SPECIFC REPOLARIZATION DISTURBANCE/LBTB/PM

## 2025-04-18 ASSESSMENT — PAIN - FUNCTIONAL ASSESSMENT: PAIN_FUNCTIONAL_ASSESSMENT: ACTIVITIES ARE NOT PREVENTED

## 2025-04-18 ASSESSMENT — PAIN DESCRIPTION - FREQUENCY: FREQUENCY: INTERMITTENT

## 2025-04-18 ASSESSMENT — PAIN DESCRIPTION - ONSET: ONSET: ON-GOING

## 2025-04-18 NOTE — CONSULTS
[facility]  Cardiology Consult      Patient:  Iliana Bejarano  YOB: 1945    MRN: [id number]   Acct: [id number]     Primary Care Physician: Davis Coppola MD    REASON FOR CONSULT:   Elevated Troponins         CHIEF COMPLAINT:    Chest pain     HISTORY OF PRESENT ILLNESS:    Iliana Bejarano is a pleasant 80 year old female patient with past medical history that includes:   Past Medical History:   Diagnosis Date    CAD (coronary artery disease)     Cancer ([facility])     skin    Cerebral artery occlusion with cerebral infarction ([facility])     Cerebrovascular accident (CVA) ([facility]) 12/22/2020    Fracture of right upper limb     GERD (gastroesophageal reflux disease)     HLD (hyperlipidemia) 04/18/2025    Hypertension     Irritable bowel disease     NSTEMI (non-ST elevated myocardial infarction) ([facility])     PONV (postoperative nausea and vomiting)     nausea    Type 2 diabetes mellitus without complications 12/03/2020    Urinary tract infection 04/18/2025    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 She was recently evaluated for PVCs. Apparently, recent cardiac studies (Echo and stress test) at [facility] were unremarkable, not available for review. The patient was admitted to the hospital on 4/18/2025 after she presented to [facility] ER with complaint with complaint of elevated BP. For the past three days, patient had increased fatigue, intermittent atypical chest discomfort, intermittent pains on both arms. She had some dizziness and

## 2025-04-18 NOTE — ED PROVIDER NOTES
[facility]  EMERGENCY DEPARTMENT ENCOUNTER   PHYSICIAN ATTESTATION    Pt Name: Iliana Bejarano  MRN: [id number]  Birthdate 1945  Date of evaluation: 9/12/20      I personally saw and examined the patient. I have reviewed and agree with the Resident findings, including all diagnostic interpretations and treatment plans as written. I was present for the key portion of any procedures performed and the inclusive time noted in any critical care statement.       History:     This patient was seen with multiple residents.    This case was checked out to me by the previous physician.  80-year-old female presented to the ER complaining. .       Feelings of lightheadedness.  No actual syncope.  Denies any chest pain but felt uncomfortable.  She was reportedly told that she had PVCs in the past but no history of any type of dangerous dysrhythmia.      The initial troponin was 34.  Rechecked at 548, which is more than a 10X.  Will need to be admitted and followed.  We are putting her back on the cardiac monitor.  The patient was under the impression that she was probably going home.      The patient states that she is scheduled for carotid surgery with  in a few weeks.  She certainly needs to have her troponin followed to see if it is on the upswing.  May require a cath for clearance for her upcoming surgery if felt necessary by that service.  We are anticoagulating.    See resident note for further details in this patient's care.    CRITICAL CARE: There was a high probability of clinically significant/life threatening deterioration in this patient's condition which required my urgent intervention.  Total critical care time was 20  minutes.  This excludes any time for separately reportable procedures.     Diagnosis: Elevated troponin, r/o NSTEMI      Admitted        DO Seth Carreon Lawrence, DO  04/18/25 9976

## 2025-04-18 NOTE — H&P
Hospitalist History & Physical     Patient: Iliana Bejarano 80 y.o. female : 1945  Date of Admission: 2025  CODE STATUS: Full Code    Date of Service: Pt seen/examined on 25 and Admitted to Observation with expected LOS less than two midnights due to medical therapy.     ASSESSMENT AND PLAN    Active Problems:      Elevated troponin with History of non-ST elevation myocardial infarction (NSTEMI)  Patient presented with atypical symptoms of angina.  Left shoulder/arm pain and lightheadedness.  No shortness of breath.  No chest pain/pressure.  Concerns for NSTEMI versus type II MI.  Per patient had recent workup at primary cardiologist at [facility] with a nonconcerning stress test and nonconcerning echo per patient.  Upon ER evaluation troponin initially 34.  Repeat 348.  Repeat 833.  Initial EKG showed sinus rhythm with PVCs and new RBBB with nonspecific ST and T wave abnormalities.  Repeat EKG showed normal sinus rhythm with RBBB and LAFB.  ST wave abnormalities noted specifically in leads II, III and aVF.  Heparin was started in the emergency department.  Cardiology was consulted, awaiting recommendations.  Nitro drip deferred at this time due to concerns for inferior MI with blood pressure decrease of 60% already in the setting of hypertensive urgency.  Ordered Lipid panel, A1c.   Continue Heparin. Start Rosuvastatin 40mg. ASA 81mg daily     Hypertensive emergency  Initial blood pressure systolically 225.  Patient does report some blurry vision however this is chronic unsure if it has increased.  Evidence of organ damage including elevated INR, and elevated troponin.  Initially 34.  Repeat 384.  Repeat 833.  Initial EKG showed sinus rhythm with PVCs and new RBBB with nonspecific ST and T wave abnormalities.  Repeat EKG showed normal sinus rhythm with RBBB and LAFB.  ST wave abnormalities noted specifically in leads II, III and aVF.  CTA showed no evidence of PE.  CT of the head was    identified within the pulmonary arterial vasculature to suggest the presence of   pulmonary embolus. No acute intrathoracic process is visualized.      2. Chronic findings are discussed.            **This report has been created using voice recognition software.  It may contain   minor errors which are inherent in voice recognition technology.**      Electronically signed by Dr. Sandra Benson        Encounter Date: 04/18/25   EKG 12 Lead   Result Value    Ventricular Rate 92    Atrial Rate 83    P-R Interval 142    QRS Duration 140    Q-T Interval 406    QTc Calculation (Bazett) 502    P Axis 47    R Axis -41    T Axis 53    Narrative    Sinus rhythm with Premature ventricular complexes  Left axis deviation  Right bundle branch block  Minimal voltage criteria for LVH, may be normal variant ( R in aVL )  Septal infarct (cited on or before 16-OCT-2015)  Nonspecific ST and T wave abnormality  Abnormal ECG  When compared with ECG of 06-JUL-2017 19:17,  Right bundle branch block Now present  Clinical correlation is indicated    Confirmed by Tato Bejarano ([id number]) on 4/18/2025 12:48:35 PM     Abnormal Labs Reviewed   PROTIME-INR - Abnormal; Notable for the following components:       Result Value    INR 1.43 (*)     All other components within normal limits   COMPREHENSIVE METABOLIC PANEL - Abnormal; Notable for the following components:    Glucose 135 (*)     CO2 21 (*)     AST 43 (*)     ALT 37 (*)     All other components within normal limits   TROPONIN - Abnormal; Notable for the following components:    Troponin, High Sensitivity 34 (*)     All other components within normal limits   TROPONIN - Abnormal; Notable for the following components:    Troponin, High Sensitivity 548 (*)     All other components within normal limits       Electronically signed by Larry Stratton PA-C on 4/18/2025 at 5:04 PM.

## 2025-04-18 NOTE — ED NOTES
This RN to the bedside for rounding. Patient sitting on the side of the bed awaiting to use the restroom Patient VSS. Respirations are easy and unlabored. Patient appears to be in no current distress at this time. Call light within reach.

## 2025-04-18 NOTE — PROCEDURES
PROCEDURE NOTE  Date: 4/18/2025   Name: Iliana Bejarano  YOB: 1945    Procedures  12 lead EKG completed. Results handed to Magaly MCQUEEN.

## 2025-04-18 NOTE — ED PROVIDER NOTES
Pt Name: Iliana Bejarano  MRN: [id number]  Birthdate 1945  Date of evaluation: 4/18/2025  ED Resident: Sandra Mandujano MD  ED Attending: Dr. Blue    CHIEF COMPLAINT       Chief Complaint   Patient presents with    Dizziness    Hypertension     History obtained from the patient.    HISTORY OF PRESENT ILLNESS    HPI  Iliana Bejarano is a 80 y.o. female who presents to the emergency department for evaluation of dizziness, HTN    The patient states that she noticed her blood pressure this morning was in the 200s systolic and she felt lightheaded like she might faint. She denied chest pain but did endorse a mild headache. She has blurred vision as well but this is more chronic and she has seen Dr. Woodson for it. She denies chest pain, but did have some back pain that was worse with breathing.     Pt took her losartan last night before bed and checked her BP, which was 198 systolic but she was not having symptoms so she went to bed. This morning was lightheaded and having some back pain.     She recently her cardiologist and has been evaluated for frequent PVCs - stress test and echo were normal per pt. EKG on arrival with PVCs and new RBBB. She does feel like she has some shortness of breath but denies history of any pulmonary disease and is a lifetime nonsmoker. Afebrile, no cough or sick contacts. She does not take AC/AP medications but states that she had a stroke several years ago that they \"did not see on scans.\" No residual deficits.       Pt offered migraine cocktail for headache but declined, stating she just wants to figure out what is going on and will let us know if she changes her mind.       The patient has no other acute complaints at this time.    ALLERGIES     Allergies   Allergen Reactions    Azithromycin Diarrhea     Other Reaction(s): Dyspepsia    Methylprednisolone Diarrhea    Pcn [Penicillins] Other (See Comments)     Upset stomach    Prednisone Diarrhea    Clindamycin Diarrhea

## 2025-04-18 NOTE — ED NOTES
This RN to the bedside to round. Patient expresses the bed is uncomfortable, patient offered a chair. Patient respirations are regular and unlabored. Patient appears to be in no current distress. Patient VSS. Call light is within reach.

## 2025-04-18 NOTE — ED NOTES
Pt to the ED via self with family. Patient presents with complaints of HTN and light headedness. Patient states that she recently saw her cardiologist and PVCs were noted during her visit. Patient states she has had uncontrollable BPs in the 200s at home. EKG done and given to provider, provider notified of BP, IV inserted. Patient is alert and oriented x 4. Respirations are regular and unlabored. Family at the bedside and call light within reach.

## 2025-04-18 NOTE — ED NOTES
This RN to the bedside for rounding. Patient updated on admission and Heparin at this time. Patient verbalizes understanding. Patient VSS. Respirations are easy and unlabored. Patient appears to be in no current distress at this time.  at the bedside. Call light within reach.

## 2025-04-18 NOTE — H&P
[facility]  Sedation/Analgesia History & Physical    Pt Name: Iliana Bejarano  Account number: [id number]  MRN: [id number]  YOB: 1945  Provider Performing Procedure: Tawny Alarcon MD MD  Referring Provider: Larry Stratton PA-C   Primary Care Physician: Davis Coppola MD  Date: 4/18/2025    PRE-PROCEDURE    Code Status: FULL CODE  Brief History/Pre-Procedure Diagnosis:   NSTEMI   Consent: : I have discussed with the patient risks, benefits, and alternatives (and relevant risks, benefits, and side effects related to alternatives or not receiving care), and likelihood of the success.   The patient and/or representative appear to understand and agree to proceed.  The discussion encompasses risks, benefits, and side effects related to the alternatives and the risks related to not receiving the proposed care, treatment, and services.     The indication, risks and benefits of the procedure and possible therapeutic consequences and alternatives were discussed with the patient. The patient was given the opportunity to ask questions and to have them answered to his/her satisfaction. Risks of the procedure include but are not limited to the following: Bleeding, hematoma including retroperitoneal hemmorhage, infection, pain and discomfort, injury to the aorta and other blood vessels, rhythm disturbance, low blood pressure, myocardial infarction, stroke, kidney damage/failure, myocardial perforation, allergic reactions to sedatives/contrast material, loss of pulse/vascular compromise requiring surgery, aneurysm/pseudoaneurysm formation, possible loss of a limb/hand/leg, needing blood transfusion, requiring emergent open heart surgery or emergent coronary intervention, the need for intubation/respiratory support, the requirement for defibrillation/cardioversion, and death. Alternatives to and omission of the suggested procedure were discussed. The patient had no further questions and wished

## 2025-04-18 NOTE — ED NOTES
Patient in bed and talking with registration at the bedside. Patient VSS and patient does not appear to be in any current distress at this time. Family at the bedside. Call light within reach.

## 2025-04-18 NOTE — ED PROVIDER NOTES
PATIENT NAME: Iliana Bejarano  MRN: [id number]  : 1945  NORRIS: 2025    I performed a history and physical examination of the patient and discussed management with the Resident. I reviewed the Resident's note and agree with the documented findings and plan of care. Any areas of disagreement are noted on the chart. I was personally present for the key portions of any procedures and have documented in the chart those procedures where I was not present during the key portions. I have reviewed the emergency nurses triage note and agree with the chief complaint, past medical history, past surgical history, allergies, medications, social and family history as documented unless otherwise noted below.    MEDICAL DEDISION MAKING (MDM)     Iliana Bejarano is a 80 y.o. female presents with HTN and lightheadedness (LH).     PMH of PVC and HTN. SBP was about 200 today at home with increasing LH.     Exam: AVSS. Nontoxic appearing. Heart: RRR, S1 and S2. Lungs CTAB. Soft abdomen w/o tenderness. Neurologically intact. No skin rashes.     Her ED EKG reveals sinus rhythm with competing junctional tachycardia and PVCs, left axis deviation, new RBBB, no acute ST elevation or hyperacute T wave.    Her first troponin has slight elevation.  Pending second troponin.  Plan to discharge if troponins are flat with close cardiology follow up within 72 hours for provacative test. Signed out at shift change.     Upon signing the chart later, it is noted that patient's second troponin has increased to 548 from initial troponin of 34.  Patient is admitted to hospital medicine service with non-STEMI.  Heparin drip is started.    Vitals:    25 1630 25 1641 25 1742 25 1745   BP:  (!) 156/102 (!) 154/99    Pulse:  74 (!) 107    Resp:  16 16    Temp:   98.2 °F (36.8 °C)    TempSrc:   Oral    SpO2:  97% 93%    Weight: 75 kg (165 lb 5.5 oz)   73.9 kg (163 lb)   Height:    1.575 m (5' 2\")     Labs Reviewed

## 2025-04-19 ENCOUNTER — APPOINTMENT (OUTPATIENT)
Age: 80
DRG: 322 | End: 2025-04-19
Attending: INTERNAL MEDICINE
Payer: MEDICARE

## 2025-04-19 VITALS
RESPIRATION RATE: 18 BRPM | BODY MASS INDEX: 30 KG/M2 | HEART RATE: 64 BPM | SYSTOLIC BLOOD PRESSURE: 106 MMHG | TEMPERATURE: 97.5 F | OXYGEN SATURATION: 97 % | DIASTOLIC BLOOD PRESSURE: 65 MMHG | WEIGHT: 163 LBS | HEIGHT: 62 IN

## 2025-04-19 PROBLEM — Z86.73 HX OF COMPLETED STROKE: Status: ACTIVE | Noted: 2025-04-19

## 2025-04-19 LAB
ANION GAP SERPL CALC-SCNC: 13 MEQ/L (ref 8–16)
BUN SERPL-MCNC: 17 MG/DL (ref 8–23)
CALCIUM SERPL-MCNC: 9.1 MG/DL (ref 8.8–10.2)
CHLORIDE SERPL-SCNC: 103 MEQ/L (ref 98–111)
CHOLEST SERPL-MCNC: 108 MG/DL (ref 100–199)
CO2 SERPL-SCNC: 21 MEQ/L (ref 22–29)
CREAT SERPL-MCNC: 0.9 MG/DL (ref 0.5–0.9)
DEPRECATED RDW RBC AUTO: 46.1 FL (ref 35–45)
ECHO AO ASC DIAM: 3.1 CM
ECHO AO ASCENDING AORTA INDEX: 1.77 CM/M2
ECHO AO SINUS VALSALVA DIAM: 2.4 CM
ECHO AO SINUS VALSALVA INDEX: 1.37 CM/M2
ECHO AO ST JNCT DIAM: 2 CM
ECHO AV AREA PEAK VELOCITY: 2 CM2
ECHO AV AREA VTI: 1.8 CM2
ECHO AV AREA/BSA PEAK VELOCITY: 1.1 CM2/M2
ECHO AV AREA/BSA VTI: 1 CM2/M2
ECHO AV CUSP MM: 1.5 CM
ECHO AV MEAN GRADIENT: 10 MMHG
ECHO AV MEAN VELOCITY: 1.5 M/S
ECHO AV PEAK GRADIENT: 20 MMHG
ECHO AV PEAK VELOCITY: 2.2 M/S
ECHO AV VELOCITY RATIO: 0.68
ECHO AV VTI: 43.6 CM
ECHO BSA: 1.8 M2
ECHO EST RA PRESSURE: 3 MMHG
ECHO LA AREA 2C: 17.7 CM2
ECHO LA AREA 4C: 19.9 CM2
ECHO LA DIAMETER INDEX: 2.23 CM/M2
ECHO LA DIAMETER: 3.9 CM
ECHO LA MAJOR AXIS: 5.8 CM
ECHO LA MINOR AXIS: 5.2 CM
ECHO LA VOL BP: 55 ML (ref 22–52)
ECHO LA VOL MOD A2C: 51 ML (ref 22–52)
ECHO LA VOL MOD A4C: 53 ML (ref 22–52)
ECHO LA VOL/BSA BIPLANE: 31 ML/M2 (ref 16–34)
ECHO LA VOLUME INDEX MOD A2C: 29 ML/M2 (ref 16–34)
ECHO LA VOLUME INDEX MOD A4C: 30 ML/M2 (ref 16–34)
ECHO LV E' LATERAL VELOCITY: 5.7 CM/S
ECHO LV E' SEPTAL VELOCITY: 5.1 CM/S
ECHO LV EDV A2C: 91 ML
ECHO LV EDV A4C: 130 ML
ECHO LV EDV INDEX A4C: 74 ML/M2
ECHO LV EDV NDEX A2C: 52 ML/M2
ECHO LV EF PHYSICIAN: 40 %
ECHO LV EJECTION FRACTION A2C: 37 %
ECHO LV EJECTION FRACTION A4C: 44 %
ECHO LV EJECTION FRACTION BIPLANE: 40 % (ref 55–100)
ECHO LV ESV A2C: 57 ML
ECHO LV ESV A4C: 73 ML
ECHO LV ESV INDEX A2C: 33 ML/M2
ECHO LV ESV INDEX A4C: 42 ML/M2
ECHO LV FRACTIONAL SHORTENING: 30 % (ref 28–44)
ECHO LV INTERNAL DIMENSION DIASTOLE INDEX: 3.03 CM/M2
ECHO LV INTERNAL DIMENSION DIASTOLIC: 5.3 CM (ref 3.9–5.3)
ECHO LV INTERNAL DIMENSION SYSTOLIC INDEX: 2.11 CM/M2
ECHO LV INTERNAL DIMENSION SYSTOLIC: 3.7 CM
ECHO LV ISOVOLUMETRIC RELAXATION TIME (IVRT): 127 MS
ECHO LV IVSD: 1.2 CM (ref 0.6–0.9)
ECHO LV MASS 2D: 256.6 G (ref 67–162)
ECHO LV MASS INDEX 2D: 146.6 G/M2 (ref 43–95)
ECHO LV POSTERIOR WALL DIASTOLIC: 1.2 CM (ref 0.6–0.9)
ECHO LV RELATIVE WALL THICKNESS RATIO: 0.45
ECHO LVOT AREA: 2.8 CM2
ECHO LVOT AV VTI INDEX: 0.64
ECHO LVOT DIAM: 1.9 CM
ECHO LVOT MEAN GRADIENT: 4 MMHG
ECHO LVOT PEAK GRADIENT: 9 MMHG
ECHO LVOT PEAK VELOCITY: 1.5 M/S
ECHO LVOT STROKE VOLUME INDEX: 45.5 ML/M2
ECHO LVOT SV: 79.6 ML
ECHO LVOT VTI: 28.1 CM
ECHO MV A VELOCITY: 1.3 M/S
ECHO MV E DECELERATION TIME (DT): 182 MS
ECHO MV E VELOCITY: 0.6 M/S
ECHO MV E/A RATIO: 0.46
ECHO MV E/E' LATERAL: 10.53
ECHO MV E/E' RATIO (AVERAGED): 11.15
ECHO MV E/E' SEPTAL: 11.76
ECHO MV REGURGITANT PEAK GRADIENT: 71 MMHG
ECHO MV REGURGITANT PEAK VELOCITY: 4.2 M/S
ECHO PV MAX VELOCITY: 0.9 M/S
ECHO PV PEAK GRADIENT: 3 MMHG
ECHO RIGHT VENTRICULAR SYSTOLIC PRESSURE (RVSP): 22 MMHG
ECHO RV FREE WALL PEAK S': 12.7 CM/S
ECHO RV INTERNAL DIMENSION: 2.7 CM
ECHO RV TAPSE: 2.1 CM (ref 1.7–?)
ECHO TV E WAVE: 0.4 M/S
ECHO TV REGURGITANT MAX VELOCITY: 2.18 M/S
ECHO TV REGURGITANT PEAK GRADIENT: 19 MMHG
ERYTHROCYTE [DISTWIDTH] IN BLOOD BY AUTOMATED COUNT: 13.5 % (ref 11.5–14.5)
GFR SERPL CREATININE-BSD FRML MDRD: 65 ML/MIN/1.73M2
GLUCOSE BLD STRIP.AUTO-MCNC: 158 MG/DL (ref 70–108)
GLUCOSE SERPL-MCNC: 135 MG/DL (ref 74–109)
HCT VFR BLD AUTO: 41.9 % (ref 37–47)
HDLC SERPL-MCNC: 38 MG/DL
HGB BLD-MCNC: 14.3 GM/DL (ref 12–16)
LDLC SERPL CALC-MCNC: 47 MG/DL
MCH RBC QN AUTO: 31.7 PG (ref 26–33)
MCHC RBC AUTO-ENTMCNC: 34.1 GM/DL (ref 32.2–35.5)
MCV RBC AUTO: 92.9 FL (ref 81–99)
PLATELET # BLD AUTO: 153 THOU/MM3 (ref 130–400)
PMV BLD AUTO: 10.8 FL (ref 9.4–12.4)
POTASSIUM SERPL-SCNC: 3.7 MEQ/L (ref 3.5–5.2)
RBC # BLD AUTO: 4.51 MILL/MM3 (ref 4.2–5.4)
SODIUM SERPL-SCNC: 137 MEQ/L (ref 135–145)
TRIGL SERPL-MCNC: 117 MG/DL (ref 0–199)
WBC # BLD AUTO: 11.4 THOU/MM3 (ref 4.8–10.8)

## 2025-04-19 PROCEDURE — 80048 BASIC METABOLIC PNL TOTAL CA: CPT

## 2025-04-19 PROCEDURE — 99239 HOSP IP/OBS DSCHRG MGMT >30: CPT | Performed by: STUDENT IN AN ORGANIZED HEALTH CARE EDUCATION/TRAINING PROGRAM

## 2025-04-19 PROCEDURE — G0378 HOSPITAL OBSERVATION PER HR: HCPCS

## 2025-04-19 PROCEDURE — 1200000000 HC SEMI PRIVATE

## 2025-04-19 PROCEDURE — 94761 N-INVAS EAR/PLS OXIMETRY MLT: CPT

## 2025-04-19 PROCEDURE — 6370000000 HC RX 637 (ALT 250 FOR IP)

## 2025-04-19 PROCEDURE — 82948 REAGENT STRIP/BLOOD GLUCOSE: CPT

## 2025-04-19 PROCEDURE — 6360000004 HC RX CONTRAST MEDICATION: Performed by: INTERNAL MEDICINE

## 2025-04-19 PROCEDURE — 93306 TTE W/DOPPLER COMPLETE: CPT | Performed by: INTERNAL MEDICINE

## 2025-04-19 PROCEDURE — 6370000000 HC RX 637 (ALT 250 FOR IP): Performed by: NURSE PRACTITIONER

## 2025-04-19 PROCEDURE — 6370000000 HC RX 637 (ALT 250 FOR IP): Performed by: INTERNAL MEDICINE

## 2025-04-19 PROCEDURE — 80061 LIPID PANEL: CPT

## 2025-04-19 PROCEDURE — 2500000003 HC RX 250 WO HCPCS: Performed by: INTERNAL MEDICINE

## 2025-04-19 PROCEDURE — 99232 SBSQ HOSP IP/OBS MODERATE 35: CPT | Performed by: NURSE PRACTITIONER

## 2025-04-19 PROCEDURE — 36415 COLL VENOUS BLD VENIPUNCTURE: CPT

## 2025-04-19 PROCEDURE — 85027 COMPLETE CBC AUTOMATED: CPT

## 2025-04-19 PROCEDURE — C8929 TTE W OR WO FOL WCON,DOPPLER: HCPCS

## 2025-04-19 RX ORDER — LOSARTAN POTASSIUM 100 MG/1
50 TABLET ORAL DAILY
Qty: 15 TABLET | Refills: 3 | Status: SHIPPED | OUTPATIENT
Start: 2025-04-19

## 2025-04-19 RX ORDER — SPIRONOLACTONE 25 MG/1
25 TABLET ORAL DAILY
Status: DISCONTINUED | OUTPATIENT
Start: 2025-04-19 | End: 2025-04-19 | Stop reason: HOSPADM

## 2025-04-19 RX ORDER — CARVEDILOL 3.12 MG/1
3.12 TABLET ORAL 2 TIMES DAILY WITH MEALS
Qty: 60 TABLET | Refills: 3 | Status: SHIPPED | OUTPATIENT
Start: 2025-04-19

## 2025-04-19 RX ORDER — LOSARTAN POTASSIUM 50 MG/1
50 TABLET ORAL DAILY
Status: DISCONTINUED | OUTPATIENT
Start: 2025-04-20 | End: 2025-04-19 | Stop reason: HOSPADM

## 2025-04-19 RX ORDER — ROSUVASTATIN CALCIUM 40 MG/1
40 TABLET, COATED ORAL NIGHTLY
Qty: 30 TABLET | Refills: 3 | Status: SHIPPED | OUTPATIENT
Start: 2025-04-19

## 2025-04-19 RX ORDER — HYDROCHLOROTHIAZIDE 25 MG/1
25 TABLET ORAL DAILY PRN
Qty: 30 TABLET | Refills: 3 | Status: SHIPPED | OUTPATIENT
Start: 2025-04-19

## 2025-04-19 RX ORDER — NITROGLYCERIN 0.4 MG/1
0.4 TABLET SUBLINGUAL EVERY 5 MIN PRN
Qty: 25 TABLET | Refills: 1 | Status: SHIPPED | OUTPATIENT
Start: 2025-04-19

## 2025-04-19 RX ORDER — SPIRONOLACTONE 25 MG/1
25 TABLET ORAL DAILY
Qty: 30 TABLET | Refills: 3 | Status: SHIPPED | OUTPATIENT
Start: 2025-04-19

## 2025-04-19 RX ORDER — ASPIRIN 81 MG/1
81 TABLET ORAL DAILY
Status: DISCONTINUED | OUTPATIENT
Start: 2025-04-19 | End: 2025-04-19 | Stop reason: HOSPADM

## 2025-04-19 RX ADMIN — CARVEDILOL 3.12 MG: 3.12 TABLET, FILM COATED ORAL at 08:02

## 2025-04-19 RX ADMIN — ALPRAZOLAM 0.25 MG: 0.25 TABLET ORAL at 08:06

## 2025-04-19 RX ADMIN — TICAGRELOR 90 MG: 90 TABLET ORAL at 08:02

## 2025-04-19 RX ADMIN — PANTOPRAZOLE SODIUM 40 MG: 40 TABLET, DELAYED RELEASE ORAL at 08:02

## 2025-04-19 RX ADMIN — HYDRALAZINE HYDROCHLORIDE 10 MG: 10 TABLET ORAL at 08:00

## 2025-04-19 RX ADMIN — SODIUM CHLORIDE, PRESERVATIVE FREE 10 ML: 5 INJECTION INTRAVENOUS at 08:03

## 2025-04-19 RX ADMIN — ALPRAZOLAM 0.25 MG: 0.25 TABLET ORAL at 00:39

## 2025-04-19 RX ADMIN — LOSARTAN POTASSIUM 100 MG: 100 TABLET, FILM COATED ORAL at 08:01

## 2025-04-19 RX ADMIN — ASPIRIN 81 MG: 81 TABLET, COATED ORAL at 09:52

## 2025-04-19 RX ADMIN — EMPAGLIFLOZIN 10 MG: 10 TABLET, FILM COATED ORAL at 09:52

## 2025-04-19 RX ADMIN — SULFUR HEXAFLUORIDE 2 ML: KIT at 08:52

## 2025-04-19 RX ADMIN — FOLIC ACID 1 MG: 1 TABLET ORAL at 08:00

## 2025-04-19 ASSESSMENT — PAIN SCALES - GENERAL: PAINLEVEL_OUTOF10: 3

## 2025-04-19 ASSESSMENT — PAIN DESCRIPTION - ORIENTATION: ORIENTATION: UPPER

## 2025-04-19 ASSESSMENT — PAIN DESCRIPTION - DESCRIPTORS: DESCRIPTORS: ACHING

## 2025-04-19 ASSESSMENT — PAIN DESCRIPTION - LOCATION: LOCATION: BACK

## 2025-04-19 NOTE — DISCHARGE SUMMARY
Hospital Medicine Discharge Summary      Patient Identification:   Iliana Bejarano   : 1945  MRN: [id number]   Account: [id number]      Patient's PCP: Davis Coppola MD    Admit Date: 2025     Discharge Date:   2025    Admitting Physician: Adair Faria DO     Discharge Physician: Adair Faria DO     Discharge Diagnoses:  NSTEMI: atypical symptoms of angina via left shoulder/arm pain and lightheadedness.  No CP, pressures or SOB.  Follows with primary cardiologist at [facility] with nonconcerning stress test and echo per patient.  Initial HST 34, repeat 348 and 833.  Initial EKG with no acute ischemic changes.  Subsequent EKG nonspecific ST changes in leads II, III and aVF.  Started heparin GTT.  Cardiology consulted and underwent LHC with successful PCI/TATA of proximal LAD .  Chronic HFrEF, NYHA III: ICM.  Not exacerbation.  Echo pending, reportedly 30-35% during LHC.  Started on Aldactone, SGLT2i ARB but no BB.  Not on diuretic.  LifeVest ordered.  Advised to follow-up outpatient with cardiology and CHF clinic.  Obstructive CAD: Present NSTEMI with subsequent PCI/TATA of proximal LAD -see above.  Started high intensity statin, ASA and Brilinta.  Will need outpatient cardiology follow-up with cardiac rehab.  HTN: Continue carvedilol 3.125 mg p.o. twice daily, losartan 50 mg p.o. daily, spironolactone 25 mg p.o. daily with holding parameters  HLD: Continue Crestor 40 mg p.o. nightly Zetia 10 mg p.o. nightly:  NIDDM 2: Last HgbA1c 6.7 2025.  Home antidiabetic regimen includes metformin, Amaryl and Trulicity.  Started on medium dose insulin sliding scale with Accu-Cheks.  Monitor blood glucose with daily BMP.  Adjust insulin accordingly.  Hypoglycemic protocols initiated.  GERD: Continue pantoprazole 40 mg p.o. 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 S/p robotic hysterectomy 2015, BSO 10/2017. Colposcopy with vaginal biopsy VAIN II 2018. S/p CO2 laser  scan of the brain, no interval change since remote MRI scan dated   3/29/2018..               **This report has been created using voice recognition software. It may contain   minor errors which are inherent in voice recognition technology.**      Electronically signed by Dr. Chacorta Rebollar      CTA CHEST W WO CONTRAST   Final Result   1. No aortic aneurysm or dissection is present. No filling defects are   identified within the pulmonary arterial vasculature to suggest the presence of   pulmonary embolus. No acute intrathoracic process is visualized.      2. Chronic findings are discussed.            **This report has been created using voice recognition software.  It may contain   minor errors which are inherent in voice recognition technology.**      Electronically signed by Dr. Sandra Benson      Vascular duplex carotid bilateral    (Results Pending)   Vascular ankle brachial index (BISHOP)    (Results Pending)          Consults:     IP CONSULT TO CARDIOLOGY  IP CONSULT TO CARDIAC REHAB  IP CONSULT TO CARDIAC REHAB    Disposition: Home  Condition at Discharge: Stable    Code Status:  Full Code     Patient Instructions:    Discharge lab work: NA  Activity: activity as tolerated  Diet: ADULT DIET; Regular      Follow-up visits:   No follow-up provider specified.       Discharge Medications:        Medication List        START taking these medications      carvedilol 3.125 MG tablet  Commonly known as: COREG  Take 1 tablet by mouth 2 times daily (with meals)     empagliflozin 10 MG tablet  Commonly known as: JARDIANCE  Take 1 tablet by mouth daily     nitroGLYCERIN 0.4 MG SL tablet  Commonly known as: Nitrostat  Place 1 tablet under the tongue every 5 minutes as needed for Chest pain up to max of 3 total doses. If no relief after 1 dose, call 911.     spironolactone 25 MG tablet  Commonly known as: ALDACTONE  Take 1 tablet by mouth daily     ticagrelor 90 MG Tabs tablet  Commonly known as: BRILINTA  Take 1 tablet by

## 2025-04-19 NOTE — POST SEDATION
[facility]  Sedation/Analgesia Post Sedation Record    Pt Name: Iliana Bejarano  Account number: [id number]  MRN: [id number]  YOB: 1945  Procedure Performed By: Tawny Alarcon MD MD   Primary Care Physician: Davis Coppola MD  Date: 4/18/2025    POST-PROCEDURE    Physicians/Assistants: Tawny Alarcon MD MD     Procedure Performed:Cath/ PCI      Sedation/Anesthesia: Versed/ Fentanyl and 2% xylocaine local anesthesia.      Estimated Blood Loss: < 50 ml.     Specimens Removed: None         Disposition of Specimen: N/A        Complications: No Immediate Complications.       Post-procedure Diagnosis/Findings:       NSTEMI  Successful PCI/TATA of proximal LAD     Recommendations:  Bed rest for 4 hours post procedure   Transfer to stepdown unit for closer observation   Monitor on Telemetry   Optimize medical therapy for Coronary Artery Disease  Aggressive risk factor modification   Access site care  Antiplatelet therapy: ASA 81 mg PO daily and Btilinta 90 mg po BID  Stop Heparin gtt   High intensity statin therapy  Obtain a Full Echocardiogram in AM  On NTG gtt for uncontrolled HTN, titrate to keep SBP below 150  Repeat EKG on the floor   IVF: NS at 75 cc/h  AM Labs: BMP, CBC, Lipid panel      Above findings and plan of care were discussed with patient and her family, questions were answered, agreeable with plan.       Tawny Alarcon MD, FACC, [facility]   Electronically signed 4/18/2025 at 8:18 PM  Interventional Cardiology

## 2025-04-19 NOTE — PROGRESS NOTES
Heart attack teaching covered with patient and/or family or significant other:  Signs and symptoms of a heart attack.  When to call 911 and the importance of calling 911.  Personal risk factors and ways to lower their risk.  4.   Importance of quitting smoking if applicable.     Heart attack booklet given to the patient and/or family or significant other. Reviewed:  How to take Nitroglycerin.  The importance of participating in Cardiac Rehab and hours of operation.   Heart Healthy Diet.  Risk factor modification.(Overweight, Obesity, Diabetes, Hypertension, Smoking, High Cholesterol, Stress)  Discharge instructions for Cath/Intervention procedure site if applicable.

## 2025-04-19 NOTE — PROCEDURES
PROCEDURE NOTE  Date: 4/18/2025   Name: Iliana Bejarano  YOB: 1945    Procedures  12 lead EKG completed. Results handed to Sia MCQUEEN.          Female

## 2025-04-19 NOTE — CARE COORDINATION
Discharge Planning Update:     Hospital day: 0  Location: 3B-30/030-A     Barriers to Discharge:  Orders received and faxed to [facility] for Life Vest for pt. Emmanuel from [facility] is aware.     SIGNED:  Carlene Mosley RN   4/19/2025, 10:51 AM

## 2025-04-19 NOTE — CARE COORDINATION
4/19/25, 4:10 PM EDT    Patient goals/plan/ treatment preferences discussed by  and .  Patient goals/plan/ treatment preferences reviewed with patient/ family.  Patient/ family verbalize understanding of discharge plan and are in agreement with goal/plan/treatment preferences.  Understanding was demonstrated using the teach back method.  AVS provided by RN at time of discharge, which includes all necessary medical information pertaining to the patients current course of illness, treatment, post-discharge goals of care, and treatment preferences.     Services At/After Discharge: DME    Life vest approved and pt has been fit. Plan discharge today. Denies any discharge needs.     Electronically signed by Carlene Mosley RN on 4/19/2025 at 4:11 PM

## 2025-04-19 NOTE — PROGRESS NOTES
Cardiology Progress Note      Patient:  Iliana Bejarano  YOB: 1945  MRN: [id number]   Acct: [id number]  Admit Date:  4/18/2025  Primary Cardiologist: none  Seen by Dr. Alarcon     Per prior cardiology consult note-  REASON FOR CONSULT:   Elevated Troponins          CHIEF COMPLAINT:    Chest pain      HISTORY OF PRESENT ILLNESS:    Iliana Bejarano is a pleasant 80 year old female patient with past medical history that includes:   Past Medical History        Past Medical History:   Diagnosis Date    CAD (coronary artery disease)      Cancer (HCC)       skin    Cerebral artery occlusion with cerebral infarction (HCC)      Cerebrovascular accident (CVA) ([facility]) 12/22/2020    Fracture of right upper limb      GERD (gastroesophageal reflux disease)      HLD (hyperlipidemia) 04/18/2025    Hypertension      Irritable bowel disease      NSTEMI (non-ST elevated myocardial infarction) ([facility])      PONV (postoperative nausea and vomiting)       nausea    Type 2 diabetes mellitus without complications 12/03/2020    Urinary tract infection 04/18/2025    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 She was recently evaluated for PVCs. Apparently, recent cardiac studies (Echo and stress test) at [facility] were unremarkable, not available for review. The patient was admitted to the hospital on 4/18/2025 after she presented to [facility] ER with complaint with complaint of elevated BP. For the past three days, patient had increased fatigue, intermittent atypical chest    Angioplasty   Angioplasty was performed prior to stent deployment. Lesion complication(s): no complications.   Supplies used: CATH BLLN ANGIO 3.84P76QQ SC EUPHORA RX   Stent   A single stent was placed. Drug-eluting stent was successfully placed.   Supplies used: STENT CORONARY GONZALO FRONTIER RX 3.5X15 MM ZOTAROLIMUS ELUT   Angioplasty   Angioplasty was performed following stent deployment. Lesion complication(s): no complications.   Supplies used: CATH BLLN ANGIO 4X8MM NC EUPHORIA RX   Angioplasty   Lesion complication(s): no complications.   Supplies used: CATH BLLN ANGIO 4X8MM NC EUPHORIA RX   Post-Intervention Lesion Assessment   The intervention was successful. The pre-interventional distal flow is normal (CARLIE 3). Post-intervention CARLIE flow is 3. There were no complications.   There is a 0% residual stenosis post intervention.        Left Heart    Left Ventricle The estimated EF = 25 - 30%.     Coronary Diagram    Diagnostic  Dominance: Right    Intervention      Lab Data:    Cardiac Enzymes:  Recent Labs     04/18/25  1716   CKTOTAL 126       CBC:   Lab Results   Component Value Date/Time    WBC 11.4 04/19/2025 04:39 AM    RBC 4.51 04/19/2025 04:39 AM    HGB 14.3 04/19/2025 04:39 AM    HCT 41.9 04/19/2025 04:39 AM     04/19/2025 04:39 AM       CMP:    Lab Results   Component Value Date/Time     04/19/2025 04:39 AM    K 3.7 04/19/2025 04:39 AM     04/19/2025 04:39 AM    CO2 21 04/19/2025 04:39 AM    BUN 17 04/19/2025 04:39 AM    CREATININE 0.9 04/19/2025 04:39 AM    AGRATIO 1.3 12/17/2024 11:21 AM    LABGLOM 65 04/19/2025 04:39 AM    LABGLOM >90 07/06/2017 08:03 PM    GLUCOSE 135 04/19/2025 04:39 AM    GLUCOSE 149 12/17/2024 11:21 AM    CALCIUM 9.1 04/19/2025 04:39 AM       Hepatic Function Panel:    Lab Results   Component Value Date/Time    ALKPHOS 58 04/18/2025 12:50 PM    ALT 37 04/18/2025 12:50 PM    AST 43 04/18/2025 12:50 PM    BILITOT 0.9 04/18/2025 12:50 PM    BILIDIR 0.2

## 2025-04-21 ENCOUNTER — TELEPHONE (OUTPATIENT)
Dept: INTERVENTIONAL RADIOLOGY/VASCULAR | Age: 80
End: 2025-04-21

## 2025-04-21 LAB — MYOGLOBIN SERPL-MCNC: 116 NG/ML

## 2025-04-21 NOTE — TELEPHONE ENCOUNTER
Schedule for vascular kasandra and carotid on 5/16/25 , arrival time 12:30pm    Call to pt , date, time and instructions reviewed with pt.

## 2025-04-26 ENCOUNTER — HOSPITAL ENCOUNTER (EMERGENCY)
Age: 80
Discharge: HOME | End: 2025-04-26
Attending: STUDENT IN AN ORGANIZED HEALTH CARE EDUCATION/TRAINING PROGRAM
Payer: MEDICARE

## 2025-04-26 VITALS
DIASTOLIC BLOOD PRESSURE: 55 MMHG | WEIGHT: 158 LBS | HEART RATE: 56 BPM | HEIGHT: 63 IN | BODY MASS INDEX: 28 KG/M2 | TEMPERATURE: 98 F | RESPIRATION RATE: 16 BRPM | OXYGEN SATURATION: 96 % | SYSTOLIC BLOOD PRESSURE: 162 MMHG

## 2025-04-26 DIAGNOSIS — Z00.00 WELL ADULT HEALTH CHECK: ICD-10-CM

## 2025-04-26 DIAGNOSIS — I10 PRIMARY HYPERTENSION: Primary | ICD-10-CM

## 2025-04-26 DIAGNOSIS — Z95.810 USES LIFEVEST DEFIBRILLATOR: ICD-10-CM

## 2025-04-26 LAB
ANION GAP SERPL CALC-SCNC: 11 MEQ/L (ref 8–16)
BASOPHILS ABSOLUTE: 0 THOU/MM3 (ref 0–0.1)
BASOPHILS NFR BLD AUTO: 0.4 %
BUN SERPL-MCNC: 14 MG/DL (ref 8–23)
CALCIUM SERPL-MCNC: 9.3 MG/DL (ref 8.8–10.2)
CHLORIDE SERPL-SCNC: 107 MEQ/L (ref 98–111)
CO2 SERPL-SCNC: 25 MEQ/L (ref 22–29)
CREAT SERPL-MCNC: 0.9 MG/DL (ref 0.5–0.9)
DEPRECATED RDW RBC AUTO: 45.4 FL (ref 35–45)
EKG ATRIAL RATE: 69 BPM
EKG P AXIS: 49 DEGREES
EKG P-R INTERVAL: 154 MS
EKG Q-T INTERVAL: 442 MS
EKG QRS DURATION: 148 MS
EKG QTC CALCULATION (BAZETT): 473 MS
EKG R AXIS: -42 DEGREES
EKG T AXIS: 48 DEGREES
EKG VENTRICULAR RATE: 69 BPM
EOSINOPHIL NFR BLD AUTO: 2.3 %
EOSINOPHILS ABSOLUTE: 0.2 THOU/MM3 (ref 0–0.4)
ERYTHROCYTE [DISTWIDTH] IN BLOOD BY AUTOMATED COUNT: 13.5 % (ref 11.5–14.5)
GFR SERPL CREATININE-BSD FRML MDRD: 65 ML/MIN/1.73M2
GLUCOSE SERPL-MCNC: 127 MG/DL (ref 74–109)
HCT VFR BLD AUTO: 41 % (ref 37–47)
HGB BLD-MCNC: 14.3 GM/DL (ref 12–16)
IMM GRANULOCYTES # BLD AUTO: 0.04 THOU/MM3 (ref 0–0.07)
IMM GRANULOCYTES NFR BLD AUTO: 0.4 %
LYMPHOCYTES ABSOLUTE: 2.2 THOU/MM3 (ref 1–4.8)
LYMPHOCYTES NFR BLD AUTO: 22.6 %
MAGNESIUM SERPL-MCNC: 2.1 MG/DL (ref 1.6–2.6)
MCH RBC QN AUTO: 32.1 PG (ref 26–33)
MCHC RBC AUTO-ENTMCNC: 34.9 GM/DL (ref 32.2–35.5)
MCV RBC AUTO: 91.9 FL (ref 81–99)
MONOCYTES ABSOLUTE: 0.7 THOU/MM3 (ref 0.4–1.3)
MONOCYTES NFR BLD AUTO: 7.4 %
NEUTROPHILS ABSOLUTE: 6.4 THOU/MM3 (ref 1.8–7.7)
NEUTROPHILS NFR BLD AUTO: 66.9 %
NRBC BLD AUTO-RTO: 0 /100 WBC
OSMOLALITY SERPL CALC.SUM OF ELEC: 287 MOSMOL/KG (ref 275–300)
PLATELET # BLD AUTO: 149 THOU/MM3 (ref 130–400)
PMV BLD AUTO: 10.6 FL (ref 9.4–12.4)
POTASSIUM SERPL-SCNC: 3.9 MEQ/L (ref 3.5–5.2)
RBC # BLD AUTO: 4.46 MILL/MM3 (ref 4.2–5.4)
SODIUM SERPL-SCNC: 143 MEQ/L (ref 135–145)
WBC # BLD AUTO: 9.6 THOU/MM3 (ref 4.8–10.8)

## 2025-04-26 PROCEDURE — 93010 ELECTROCARDIOGRAM REPORT: CPT | Performed by: INTERNAL MEDICINE

## 2025-04-26 PROCEDURE — 83735 ASSAY OF MAGNESIUM: CPT

## 2025-04-26 PROCEDURE — 36415 COLL VENOUS BLD VENIPUNCTURE: CPT

## 2025-04-26 PROCEDURE — 85025 COMPLETE CBC W/AUTO DIFF WBC: CPT

## 2025-04-26 PROCEDURE — 93005 ELECTROCARDIOGRAM TRACING: CPT | Performed by: STUDENT IN AN ORGANIZED HEALTH CARE EDUCATION/TRAINING PROGRAM

## 2025-04-26 PROCEDURE — 80048 BASIC METABOLIC PNL TOTAL CA: CPT

## 2025-04-26 PROCEDURE — 6370000000 HC RX 637 (ALT 250 FOR IP)

## 2025-04-26 PROCEDURE — 99284 EMERGENCY DEPT VISIT MOD MDM: CPT

## 2025-04-26 RX ORDER — HYDRALAZINE HYDROCHLORIDE 25 MG/1
25 TABLET, FILM COATED ORAL ONCE
Status: COMPLETED | OUTPATIENT
Start: 2025-04-26 | End: 2025-04-26

## 2025-04-26 RX ADMIN — HYDRALAZINE HYDROCHLORIDE 25 MG: 25 TABLET ORAL at 04:47

## 2025-04-26 NOTE — ED PROVIDER NOTES
[facility] EMERGENCY DEPARTMENT  EMERGENCY DEPARTMENT ENCOUNTER          Pt Name: Iliana Bejarano  MRN: [id number]  Birthdate 1945  Date of evaluation: 4/26/2025  Physician: Ron Crawley MD  Supervising Attending Physician: Jimi Terry DO       CHIEF COMPLAINT       Chief Complaint   Patient presents with    Hypertension    Pacemaker Problem         HISTORY OF PRESENT ILLNESS    HPI  Iliana Bejarano is a 80 y.o. female with significant for CAD s/p PCI with stenting (4/18/2025) [Dr. Alarcon], CVA, HTN, HLD, DMT2 who presents to the emergency department from home for evaluation of HTN and troubleshooting LifeVest. She is on ASA-81 & Brilinta.  She has not been shocked by LifeVest ever.  States that she was attempting to change her LifeVest battery this evening and when she placed the battery in, the machine was flashing a question vadim sign and flashing red.  She attempted to take the battery out and place it again and similar episode occurred.  She became concerned at that point therefore came to the ED for further evaluation.  Denies any CP, SOB, abdominal pain, nausea, vomiting.  Does report some pain behind her left ear that has been present ever since wearing the LifeVest as the sling is pulling on the muscles there but also reports a dry cough recently.  Denies any congestion, sore throat, fever, chills.  No recent dysuria, hematuria, hematochezia, melena.  Denies any numbness or tingling in the extremities. The patient has no other acute complaints at this time.      PAST MEDICAL AND SURGICAL HISTORY     Past Medical History:   Diagnosis Date    CAD (coronary artery disease)     Cancer (HCC)     skin    Cerebral artery occlusion with cerebral infarction (HCC)     Cerebrovascular accident (CVA) (HCC) 12/22/2020    Fracture of right upper limb     GERD (gastroesophageal reflux disease)     HLD (hyperlipidemia) 04/18/2025    Hypertension     Irritable bowel disease      tenderness, deformity or signs of injury.      Cervical back: Normal range of motion and neck supple. No rigidity or tenderness.      Right lower leg: No edema.      Left lower leg: No edema.   Lymphadenopathy:      Cervical: Cervical adenopathy (Single tender left postauricular LN) present.   Skin:     General: Skin is warm and dry.      Capillary Refill: Capillary refill takes less than 2 seconds.      Coloration: Skin is not jaundiced or pale.      Findings: No bruising, erythema, lesion or rash.   Neurological:      General: No focal deficit present.      Mental Status: She is alert and oriented to person, place, and time.      Sensory: No sensory deficit.      Motor: No weakness.   Psychiatric:         Mood and Affect: Mood normal.         Behavior: Behavior normal.         ED RESULTS   Laboratory results (none if blank):  Labs Reviewed   CBC WITH AUTO DIFFERENTIAL - Abnormal; Notable for the following components:       Result Value    RDW-SD 45.4 (*)     All other components within normal limits   BASIC METABOLIC PANEL - Abnormal; Notable for the following components:    Glucose 127 (*)     All other components within normal limits   MAGNESIUM   ANION GAP   OSMOLALITY   GLOMERULAR FILTRATION RATE, ESTIMATED     All laboratory results are individually reviewed and interpreted by me in the clinical context of this patient.  See ED course below for results interpretation if applicable.  (A negative COVID-19 test should be interpreted as COVID no longer suspected unless otherwise noted in this encounter documentation note)  (Any cultures that may have been sent were not resulted at the time of this patient ED visit)      Radiologic studies results available at the moment of this note (None if blank):  No orders to display     See ED course below for my interpretation if applicable.  All radiology images independently reviewed by me in the clinical context of this patient, in addition to interpretation provided by

## 2025-04-26 NOTE — DISCHARGE INSTR - COC
Continuity of Care Form    Patient Name: Iliana Bejarano   :  1945  MRN:  [id number]    Admit date:  2025  Discharge date:  ***    Code Status Order: Prior   Advance Directives:     Admitting Physician:  No admitting provider for patient encounter.  PCP: Davis Coppola MD    Discharging Nurse: ***  Discharging Hospital Unit/Room#: 08/008A  Discharging Unit Phone Number: ***    Emergency Contact:   Extended Emergency Contact Information  Primary Emergency Contact: Ericka Hodgson   [facility]  Home Phone: [phone]  Relation: Child  Secondary Emergency Contact: KOLE GEORGE  Mobile Phone: [phone]  Relation: Domestic Partner    Past Surgical History:  Past Surgical History:   Procedure Laterality Date    CARDIAC PROCEDURE N/A 2025    Left heart cath / coronary angiography performed by Tawny Alarcon MD at [facility] CARDIAC CATH LAB    CARDIAC PROCEDURE N/A 2025    Percutaneous coronary intervention performed by Tawny Alarcon MD at [facility] CARDIAC CATH LAB    CERVICAL FUSION      COLONOSCOPY      DILATION AND CURETTAGE OF UTERUS      EYE SURGERY      bilateral cataract    HEMORRHOID SURGERY  ??    LEEP  2015    with D&C    PILONIDAL CYST EXCISION  1965    SKIN BIOPSY      TONSILLECTOMY      as a child    TYMPANOSTOMY TUBE PLACEMENT Left        Immunization History:   Immunization History   Administered Date(s) Administered    COVID-19, NOVAVAX, (age 12y+), IM, 5mcg/0.5mL 2024    COVID-19, PFIZER Bivalent, DO NOT Dilute, (age 12y+), IM, 30 mcg/0.3 mL 10/30/2022    COVID-19, PFIZER PURPLE top, DILUTE for use, (age 12 y+), 30mcg/0.3mL 2021, 2021    COVID-19, PFIZER, , (age 12y+), IM, 30mcg/0.3mL 2023       Active Problems:  Patient Active Problem List   Diagnosis Code    Elevated troponin R79.89    Obesity (BMI 30.0-34.9) E66.811    Hypertension I10    Generalized anxiety disorder F41.1    Type 2 diabetes mellitus without  complications (HCC) E11.9    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    History of non-ST elevation myocardial infarction (NSTEMI) I25.2    HLD (hyperlipidemia) E78.5    History of bradycardia Z87.898    Hypertensive urgency I16.0    NSTEMI (non-ST elevated myocardial infarction) (HCC) I21.4    Hx of completed stroke Z86.73       Isolation/Infection:   Isolation            No Isolation          Patient Infection Status    None to display              Nurse Assessment:  Last Vital Signs: BP (!) 162/55   Pulse 56   Temp 98 °F (36.7 °C) (Oral)   Resp 16   Ht 1.6 m (5' 3\")   Wt 71.7 kg (158 lb)   SpO2 96%   BMI 27.99 kg/m²     Last documented pain score (0-10 scale):    Last Weight:   Wt Readings from Last 1 Encounters:   04/26/25 71.7 kg (158 lb)     Mental Status:  {IP PT MENTAL STATUS:20030}    IV Access:  { LENIN IV ACCESS:035985998}    Nursing Mobility/ADLs:  Walking   {CHP DME ADLs:229428576}  Transfer  {CHP DME ADLs:911848237}  Bathing  {CHP DME ADLs:849419266}  Dressing  {CHP DME ADLs:733936663}  Toileting  {CHP DME ADLs:464778239}  Feeding  {P DME ADLs:553341541}  Med Admin  {P DME ADLs:555168802}  Med Delivery   { LENIN MED Delivery:349958416}    Wound Care Documentation and Therapy:  Incision 04/20/15 Perineum Inner (Active)   Number of days: 3658       Puncture 04/18/25 Radial (Active)   Number of days: 7        Elimination:  Continence:   Bowel: {YES / NO:19727}  Bladder: {YES / NO:19727}  Urinary Catheter: {Urinary Catheter:896433305}   Colostomy/Ileostomy/Ileal Conduit: {YES / NO:19727}       Date of Last BM: ***  No intake or output data in the 24 hours ending 04/26/25 0615  No intake/output data recorded.    Safety Concerns:     { LENIN Safety Concerns:201885594}    Impairments/Disabilities:      {[facility] Impairments/Disabilities:285125530}    Nutrition Therapy:  Current Nutrition Therapy:   { LENIN Diet List:459484162}    Routes of Feeding: {CHP DME Other Feedings:024572795}  Liquids: {Slp

## 2025-04-26 NOTE — DISCHARGE INSTRUCTIONS
You were seen in the ED for high blood pressure and concern for LifeVest malfunction. Continue to take all medications as prescribed and indicated.  Follow-up with cardiology at scheduled appointment for further management.  Also follow-up with PCP as soon as possible for further evaluation and management.  Return to the ED for any new or worsening symptoms, or any additional concerns.

## 2025-04-26 NOTE — ED TRIAGE NOTES
Pt present to the ED from home with c/c of hypertension and defibrillator problem. Pt states she was changing the battery in her defibrillator when, \"it was flashing like it was going to shock me\". Pt states she was seen in the ED on Saturday for a heart attack and a stent was placed. Pt states she is taking the medications as prescribed. Pt also reports she is currently taking medications for hypertension. EKG completed. Pt denies any chest pain, but states she is having pain behind her left ear.

## 2025-05-01 ENCOUNTER — HOSPITAL ENCOUNTER (OUTPATIENT)
Age: 80
Discharge: HOME | End: 2025-05-01
Payer: MEDICARE

## 2025-05-01 ENCOUNTER — OFFICE VISIT (OUTPATIENT)
Dept: CARDIOLOGY CLINIC | Age: 80
End: 2025-05-01
Payer: MEDICARE

## 2025-05-01 VITALS
HEART RATE: 49 BPM | SYSTOLIC BLOOD PRESSURE: 154 MMHG | BODY MASS INDEX: 28.56 KG/M2 | HEIGHT: 63 IN | DIASTOLIC BLOOD PRESSURE: 60 MMHG | WEIGHT: 161.2 LBS

## 2025-05-01 DIAGNOSIS — I21.4 NSTEMI (NON-ST ELEVATED MYOCARDIAL INFARCTION) (HCC): ICD-10-CM

## 2025-05-01 DIAGNOSIS — I10 HYPERTENSION, UNSPECIFIED TYPE: ICD-10-CM

## 2025-05-01 DIAGNOSIS — I21.4 NSTEMI (NON-ST ELEVATED MYOCARDIAL INFARCTION) (HCC): Primary | ICD-10-CM

## 2025-05-01 LAB
ANION GAP SERPL CALC-SCNC: 10 MEQ/L (ref 8–16)
BUN SERPL-MCNC: 17 MG/DL (ref 8–23)
CALCIUM SERPL-MCNC: 9.6 MG/DL (ref 8.8–10.2)
CHLORIDE SERPL-SCNC: 103 MEQ/L (ref 98–111)
CO2 SERPL-SCNC: 26 MEQ/L (ref 22–29)
CREAT SERPL-MCNC: 1.1 MG/DL (ref 0.5–0.9)
GFR SERPL CREATININE-BSD FRML MDRD: 51 ML/MIN/1.73M2
GLUCOSE SERPL-MCNC: 91 MG/DL (ref 74–109)
POTASSIUM SERPL-SCNC: 4.3 MEQ/L (ref 3.5–5.2)
SODIUM SERPL-SCNC: 139 MEQ/L (ref 135–145)

## 2025-05-01 PROCEDURE — 80048 BASIC METABOLIC PNL TOTAL CA: CPT

## 2025-05-01 PROCEDURE — 36415 COLL VENOUS BLD VENIPUNCTURE: CPT

## 2025-05-01 PROCEDURE — 3078F DIAST BP <80 MM HG: CPT | Performed by: INTERNAL MEDICINE

## 2025-05-01 PROCEDURE — 1123F ACP DISCUSS/DSCN MKR DOCD: CPT | Performed by: INTERNAL MEDICINE

## 2025-05-01 PROCEDURE — 3077F SYST BP >= 140 MM HG: CPT | Performed by: INTERNAL MEDICINE

## 2025-05-01 PROCEDURE — 99214 OFFICE O/P EST MOD 30 MIN: CPT | Performed by: INTERNAL MEDICINE

## 2025-05-01 PROCEDURE — 1159F MED LIST DOCD IN RCRD: CPT | Performed by: INTERNAL MEDICINE

## 2025-05-01 PROCEDURE — 93000 ELECTROCARDIOGRAM COMPLETE: CPT | Performed by: INTERNAL MEDICINE

## 2025-05-01 NOTE — PROGRESS NOTES
[facility] [place] [facility]  [facility] CARDIOLOGY  [address]  [address]  [place] [facility] [address]  Dept: [phone]  Dept Fax: [phone]  Loc: [phone]    Visit Date: 5/1/2025  Ms. Bejarano is a 80 y.o. female who presented for:  Post admission  NSTEMI  HPI:   Iliana Bejarano is a pleasant 80 y.o. 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 Has h/o PVCs. She was recently admitted to [facility]. Chest CTA revealed no evidence for PE, aortic aneurysm or dissection. Cardiology was called for rising troponin . HS Troponin 34, 548, 833. . Echocardiogram 4/2025 revealed an EF of 35-40%, mild aortic stenosis. On 4/2025, patient underwent PCI of LAD. Patient denies chest pain, shortness of breath, dyspnea on exertion.       Current Outpatient Medications:     empagliflozin (JARDIANCE) 10 MG tablet, Take 1 tablet by mouth daily, Disp: 30 tablet, Rfl: 3    rosuvastatin (CRESTOR) 40 MG tablet, Take 1 tablet by mouth nightly, Disp: 30 tablet, Rfl: 3    losartan (COZAAR) 100 MG tablet, Take 0.5 tablets by mouth daily, Disp: 15 tablet, Rfl: 3    spironolactone (ALDACTONE) 25 MG tablet, Take 1 tablet by mouth daily, Disp: 30 tablet, Rfl: 3    hydroCHLOROthiazide (HYDRODIURIL) 25 MG tablet, Take 1 tablet by mouth daily as needed (swelling), Disp: 30 tablet, Rfl: 3    ticagrelor (BRILINTA) 90 MG TABS tablet, Take 1 tablet by mouth 2 times daily, Disp: 60 tablet, Rfl: 3    nitroGLYCERIN (NITROSTAT)

## 2025-05-01 NOTE — PROGRESS NOTES
Follow up.    Last EKG done on 04/26/2025.    Denies chest pain, palpitations, dizziness, shortness of breath, and edema.     No cardiac concerns at this time.     Patient had 1 stent placed on  04/18/2025.   Warm/Dry

## 2025-05-16 ENCOUNTER — HOSPITAL ENCOUNTER (OUTPATIENT)
Dept: INTERVENTIONAL RADIOLOGY/VASCULAR | Age: 80
Discharge: HOME | End: 2025-05-18
Payer: MEDICARE

## 2025-05-16 DIAGNOSIS — I21.4 NSTEMI (NON-ST ELEVATED MYOCARDIAL INFARCTION) (HCC): ICD-10-CM

## 2025-05-16 DIAGNOSIS — Z86.73 HX OF COMPLETED STROKE: ICD-10-CM

## 2025-05-16 PROCEDURE — 93880 EXTRACRANIAL BILAT STUDY: CPT

## 2025-05-16 PROCEDURE — 93922 UPR/L XTREMITY ART 2 LEVELS: CPT

## 2025-05-18 PROBLEM — R79.89 ELEVATED TROPONIN: Status: RESOLVED | Noted: 2025-04-18 | Resolved: 2025-05-18

## 2025-05-19 ENCOUNTER — TELEPHONE (OUTPATIENT)
Dept: CARDIOLOGY CLINIC | Age: 80
End: 2025-05-19

## 2025-05-19 DIAGNOSIS — I70.212 ATHEROSCLEROSIS OF NATIVE ARTERY OF LEFT LOWER EXTREMITY WITH INTERMITTENT CLAUDICATION: Primary | ICD-10-CM

## 2025-05-19 NOTE — TELEPHONE ENCOUNTER
Findings are suggestive of severe PAD  BISHOP has worsened when compared to prior study  Inform patient   Cont current meds  Schedule peripheral angiogram with myself on 5/30/2025 at 10 am

## 2025-05-20 NOTE — TELEPHONE ENCOUNTER
Patient's creatine level is a little elevated on 5/1/25, creatine previously has been good.    Do you want to prehydrate ?

## 2025-05-20 NOTE — TELEPHONE ENCOUNTER
10 am slot requested  Patient may come at 6 am and be started on NS at 100 cc/h on day of procedure

## 2025-05-20 NOTE — TELEPHONE ENCOUNTER
Spoke to patient, notified.  Angiogram ordered and brought to scheduling.    Pt also asking about her carotid duplex.  Prev followed with Dr Weeks at [facility].   Will no longer be following at [facility].   She is open to vascular referral at [facility] if needed.

## 2025-05-21 NOTE — TELEPHONE ENCOUNTER
Peripheral angiogram scheduled on 5/30/25, arrival time 8 am for prehydration    Pt is to hold HCTZ, spirolactone and diabetic medications the day of.     Spoke to Pippa , she is aware its for a prehydrate and pt needs NS at 100 cc/h on day of procedure    Call to pt, date, time and instructions reviewed with pt and mailed.

## 2025-05-28 ENCOUNTER — PREP FOR PROCEDURE (OUTPATIENT)
Dept: CARDIOLOGY | Age: 80
End: 2025-05-28

## 2025-05-28 RX ORDER — NITROGLYCERIN 0.4 MG/1
0.4 TABLET SUBLINGUAL EVERY 5 MIN PRN
Status: CANCELLED | OUTPATIENT
Start: 2025-05-28

## 2025-05-28 RX ORDER — SODIUM CHLORIDE 9 MG/ML
INJECTION, SOLUTION INTRAVENOUS PRN
Status: CANCELLED | OUTPATIENT
Start: 2025-05-28

## 2025-05-28 RX ORDER — SODIUM CHLORIDE 0.9 % (FLUSH) 0.9 %
5-40 SYRINGE (ML) INJECTION PRN
Status: CANCELLED | OUTPATIENT
Start: 2025-05-28

## 2025-05-28 RX ORDER — SODIUM CHLORIDE 9 MG/ML
INJECTION, SOLUTION INTRAVENOUS CONTINUOUS
Status: CANCELLED | OUTPATIENT
Start: 2025-05-28

## 2025-05-28 RX ORDER — ASPIRIN 325 MG
325 TABLET ORAL ONCE
Status: CANCELLED | OUTPATIENT
Start: 2025-05-28 | End: 2025-05-28

## 2025-05-28 RX ORDER — SODIUM CHLORIDE 0.9 % (FLUSH) 0.9 %
5-40 SYRINGE (ML) INJECTION EVERY 12 HOURS SCHEDULED
Status: CANCELLED | OUTPATIENT
Start: 2025-05-28

## 2025-05-30 ENCOUNTER — HOSPITAL ENCOUNTER (OUTPATIENT)
Dept: INTERVENTIONAL RADIOLOGY/VASCULAR | Age: 80
Setting detail: OBSERVATION
Discharge: HOME | End: 2025-05-31
Attending: INTERNAL MEDICINE | Admitting: INTERNAL MEDICINE
Payer: MEDICARE

## 2025-05-30 DIAGNOSIS — I49.3 VENTRICULAR ECTOPIC BEATS: Primary | ICD-10-CM

## 2025-05-30 DIAGNOSIS — I70.212 ATHEROSCLEROSIS OF NATIVE ARTERY OF LEFT LOWER EXTREMITY WITH INTERMITTENT CLAUDICATION: ICD-10-CM

## 2025-05-30 PROBLEM — I73.9 PVD (PERIPHERAL VASCULAR DISEASE): Status: ACTIVE | Noted: 2025-05-30

## 2025-05-30 PROBLEM — I73.9 PAD (PERIPHERAL ARTERY DISEASE): Status: ACTIVE | Noted: 2025-05-30

## 2025-05-30 LAB
ABO GROUP BLD: NORMAL
ACTIVATED CLOTTING TIME: 187 SECONDS (ref 1–150)
ACTIVATED CLOTTING TIME: 205 SECONDS (ref 1–150)
ANION GAP SERPL CALC-SCNC: 13 MEQ/L (ref 8–16)
APTT PPP: 33.1 SECONDS (ref 22–38)
BUN SERPL-MCNC: 22 MG/DL (ref 8–23)
CALCIUM SERPL-MCNC: 9.4 MG/DL (ref 8.8–10.2)
CHLORIDE SERPL-SCNC: 100 MEQ/L (ref 98–111)
CHOLEST SERPL-MCNC: 98 MG/DL (ref 100–199)
CO2 SERPL-SCNC: 20 MEQ/L (ref 22–29)
CREAT SERPL-MCNC: 1.2 MG/DL (ref 0.5–0.9)
DEPRECATED RDW RBC AUTO: 47.2 FL (ref 35–45)
EKG ATRIAL RATE: 56 BPM
EKG ATRIAL RATE: 57 BPM
EKG P AXIS: 41 DEGREES
EKG P AXIS: 57 DEGREES
EKG P-R INTERVAL: 156 MS
EKG P-R INTERVAL: 166 MS
EKG Q-T INTERVAL: 494 MS
EKG Q-T INTERVAL: 500 MS
EKG QRS DURATION: 154 MS
EKG QRS DURATION: 154 MS
EKG QTC CALCULATION (BAZETT): 476 MS
EKG QTC CALCULATION (BAZETT): 486 MS
EKG R AXIS: -46 DEGREES
EKG R AXIS: -52 DEGREES
EKG T AXIS: -11 DEGREES
EKG T AXIS: -9 DEGREES
EKG VENTRICULAR RATE: 56 BPM
EKG VENTRICULAR RATE: 57 BPM
ERYTHROCYTE [DISTWIDTH] IN BLOOD BY AUTOMATED COUNT: 13.5 % (ref 11.5–14.5)
GFR SERPL CREATININE-BSD FRML MDRD: 46 ML/MIN/1.73M2
GLUCOSE BLD STRIP.AUTO-MCNC: 116 MG/DL (ref 70–108)
GLUCOSE BLD STRIP.AUTO-MCNC: 73 MG/DL (ref 70–108)
GLUCOSE SERPL-MCNC: 117 MG/DL (ref 74–109)
HCT VFR BLD AUTO: 42.1 % (ref 37–47)
HDLC SERPL-MCNC: 41 MG/DL
HGB BLD-MCNC: 14.4 GM/DL (ref 12–16)
IAT IGG-SP REAG SERPL QL: NORMAL
INR PPP: 1.51 (ref 0.85–1.13)
KCT BLD-ACNC: 262 SECONDS (ref 1–150)
KCT BLD-ACNC: 308 SECONDS (ref 1–150)
LDLC SERPL CALC-MCNC: 38 MG/DL
MCH RBC QN AUTO: 32.4 PG (ref 26–33)
MCHC RBC AUTO-ENTMCNC: 34.2 GM/DL (ref 32.2–35.5)
MCV RBC AUTO: 94.8 FL (ref 81–99)
PLATELET # BLD AUTO: 157 THOU/MM3 (ref 130–400)
PMV BLD AUTO: 10.4 FL (ref 9.4–12.4)
POTASSIUM SERPL-SCNC: 4.3 MEQ/L (ref 3.5–5.2)
RBC # BLD AUTO: 4.44 MILL/MM3 (ref 4.2–5.4)
RH BLD: NORMAL
SODIUM SERPL-SCNC: 133 MEQ/L (ref 135–145)
TRIGL SERPL-MCNC: 96 MG/DL (ref 0–199)
WBC # BLD AUTO: 9.5 THOU/MM3 (ref 4.8–10.8)

## 2025-05-30 PROCEDURE — 2580000003 HC RX 258: Performed by: INTERNAL MEDICINE

## 2025-05-30 PROCEDURE — 85347 COAGULATION TIME ACTIVATED: CPT

## 2025-05-30 PROCEDURE — 86900 BLOOD TYPING SEROLOGIC ABO: CPT

## 2025-05-30 PROCEDURE — 75716 ARTERY X-RAYS ARMS/LEGS: CPT | Performed by: INTERNAL MEDICINE

## 2025-05-30 PROCEDURE — 93005 ELECTROCARDIOGRAM TRACING: CPT | Performed by: PHYSICIAN ASSISTANT

## 2025-05-30 PROCEDURE — 85730 THROMBOPLASTIN TIME PARTIAL: CPT

## 2025-05-30 PROCEDURE — 76937 US GUIDE VASCULAR ACCESS: CPT

## 2025-05-30 PROCEDURE — 36415 COLL VENOUS BLD VENIPUNCTURE: CPT

## 2025-05-30 PROCEDURE — C1769 GUIDE WIRE: HCPCS

## 2025-05-30 PROCEDURE — 6360000002 HC RX W HCPCS: Performed by: INTERNAL MEDICINE

## 2025-05-30 PROCEDURE — 2580000003 HC RX 258: Performed by: PHYSICIAN ASSISTANT

## 2025-05-30 PROCEDURE — 6370000000 HC RX 637 (ALT 250 FOR IP): Performed by: INTERNAL MEDICINE

## 2025-05-30 PROCEDURE — 2709999900 IR ANGIOGRAM EXTREMITY BILATERAL

## 2025-05-30 PROCEDURE — 85610 PROTHROMBIN TIME: CPT

## 2025-05-30 PROCEDURE — 37224 HC FEM POP TERRITORY PLASTY: CPT

## 2025-05-30 PROCEDURE — 86885 COOMBS TEST INDIRECT QUAL: CPT

## 2025-05-30 PROCEDURE — 6360000004 HC RX CONTRAST MEDICATION: Performed by: INTERNAL MEDICINE

## 2025-05-30 PROCEDURE — 75630 X-RAY AORTA LEG ARTERIES: CPT

## 2025-05-30 PROCEDURE — 37224 PR REVSC OPN/PRG FEM/POP W/ANGIOPLASTY UNI: CPT | Performed by: INTERNAL MEDICINE

## 2025-05-30 PROCEDURE — G0378 HOSPITAL OBSERVATION PER HR: HCPCS

## 2025-05-30 PROCEDURE — 86901 BLOOD TYPING SEROLOGIC RH(D): CPT

## 2025-05-30 PROCEDURE — 93005 ELECTROCARDIOGRAM TRACING: CPT | Performed by: INTERNAL MEDICINE

## 2025-05-30 PROCEDURE — 80061 LIPID PANEL: CPT

## 2025-05-30 PROCEDURE — 82948 REAGENT STRIP/BLOOD GLUCOSE: CPT

## 2025-05-30 PROCEDURE — 80048 BASIC METABOLIC PNL TOTAL CA: CPT

## 2025-05-30 PROCEDURE — 85027 COMPLETE CBC AUTOMATED: CPT

## 2025-05-30 RX ORDER — SODIUM CHLORIDE 0.9 % (FLUSH) 0.9 %
5-40 SYRINGE (ML) INJECTION PRN
Status: DISCONTINUED | OUTPATIENT
Start: 2025-05-30 | End: 2025-05-31 | Stop reason: HOSPADM

## 2025-05-30 RX ORDER — DEXTROSE MONOHYDRATE 100 MG/ML
INJECTION, SOLUTION INTRAVENOUS CONTINUOUS PRN
Status: DISCONTINUED | OUTPATIENT
Start: 2025-05-30 | End: 2025-05-31 | Stop reason: HOSPADM

## 2025-05-30 RX ORDER — SODIUM CHLORIDE 0.9 % (FLUSH) 0.9 %
5-40 SYRINGE (ML) INJECTION EVERY 12 HOURS SCHEDULED
Status: DISCONTINUED | OUTPATIENT
Start: 2025-05-30 | End: 2025-05-31 | Stop reason: HOSPADM

## 2025-05-30 RX ORDER — CALCIUM CARBONATE 500(1250)
1 TABLET ORAL DAILY
Status: DISCONTINUED | OUTPATIENT
Start: 2025-05-30 | End: 2025-05-31 | Stop reason: HOSPADM

## 2025-05-30 RX ORDER — ASPIRIN 81 MG/1
81 TABLET ORAL DAILY
Status: DISCONTINUED | OUTPATIENT
Start: 2025-05-31 | End: 2025-05-31 | Stop reason: HOSPADM

## 2025-05-30 RX ORDER — CARVEDILOL 3.12 MG/1
3.12 TABLET ORAL 2 TIMES DAILY WITH MEALS
Status: DISCONTINUED | OUTPATIENT
Start: 2025-05-30 | End: 2025-05-31 | Stop reason: HOSPADM

## 2025-05-30 RX ORDER — ALPRAZOLAM 0.25 MG
0.25 TABLET ORAL 2 TIMES DAILY
Status: DISCONTINUED | OUTPATIENT
Start: 2025-05-30 | End: 2025-05-31 | Stop reason: HOSPADM

## 2025-05-30 RX ORDER — IOPAMIDOL 612 MG/ML
INJECTION, SOLUTION INTRAVASCULAR PRN
Status: COMPLETED | OUTPATIENT
Start: 2025-05-30 | End: 2025-05-30

## 2025-05-30 RX ORDER — GLIPIZIDE 5 MG/1
2.5 TABLET ORAL
Status: DISCONTINUED | OUTPATIENT
Start: 2025-05-31 | End: 2025-05-30 | Stop reason: DRUGHIGH

## 2025-05-30 RX ORDER — FOLIC ACID 1 MG/1
1 TABLET ORAL DAILY
Status: DISCONTINUED | OUTPATIENT
Start: 2025-05-30 | End: 2025-05-31 | Stop reason: HOSPADM

## 2025-05-30 RX ORDER — SODIUM CHLORIDE 9 MG/ML
INJECTION, SOLUTION INTRAVENOUS PRN
Status: DISCONTINUED | OUTPATIENT
Start: 2025-05-30 | End: 2025-05-31 | Stop reason: HOSPADM

## 2025-05-30 RX ORDER — HYDROCHLOROTHIAZIDE 25 MG/1
25 TABLET ORAL DAILY PRN
Status: DISCONTINUED | OUTPATIENT
Start: 2025-05-30 | End: 2025-05-31 | Stop reason: HOSPADM

## 2025-05-30 RX ORDER — FENTANYL CITRATE 50 UG/ML
INJECTION, SOLUTION INTRAMUSCULAR; INTRAVENOUS PRN
Status: COMPLETED | OUTPATIENT
Start: 2025-05-30 | End: 2025-05-30

## 2025-05-30 RX ORDER — PANTOPRAZOLE SODIUM 40 MG/1
40 TABLET, DELAYED RELEASE ORAL
Status: DISCONTINUED | OUTPATIENT
Start: 2025-05-31 | End: 2025-05-31 | Stop reason: HOSPADM

## 2025-05-30 RX ORDER — DULAGLUTIDE 3 MG/.5ML
3 INJECTION, SOLUTION SUBCUTANEOUS WEEKLY
COMMUNITY

## 2025-05-30 RX ORDER — ACETAMINOPHEN 325 MG/1
650 TABLET ORAL EVERY 4 HOURS PRN
Status: DISCONTINUED | OUTPATIENT
Start: 2025-05-30 | End: 2025-05-31 | Stop reason: HOSPADM

## 2025-05-30 RX ORDER — ASPIRIN 325 MG
325 TABLET ORAL ONCE
Status: DISCONTINUED | OUTPATIENT
Start: 2025-05-30 | End: 2025-05-30

## 2025-05-30 RX ORDER — SPIRONOLACTONE 25 MG/1
25 TABLET ORAL DAILY
Status: DISCONTINUED | OUTPATIENT
Start: 2025-05-30 | End: 2025-05-31 | Stop reason: HOSPADM

## 2025-05-30 RX ORDER — GLIPIZIDE 5 MG/1
5 TABLET ORAL
Status: DISCONTINUED | OUTPATIENT
Start: 2025-05-30 | End: 2025-05-31 | Stop reason: HOSPADM

## 2025-05-30 RX ORDER — NITROGLYCERIN 0.4 MG/1
0.4 TABLET SUBLINGUAL EVERY 5 MIN PRN
Status: DISCONTINUED | OUTPATIENT
Start: 2025-05-30 | End: 2025-05-31 | Stop reason: HOSPADM

## 2025-05-30 RX ORDER — VITAMIN B COMPLEX
1000 TABLET ORAL DAILY
Status: DISCONTINUED | OUTPATIENT
Start: 2025-05-30 | End: 2025-05-31 | Stop reason: HOSPADM

## 2025-05-30 RX ORDER — HEPARIN SODIUM 5000 [USP'U]/ML
INJECTION, SOLUTION INTRAVENOUS; SUBCUTANEOUS PRN
Status: COMPLETED | OUTPATIENT
Start: 2025-05-30 | End: 2025-05-30

## 2025-05-30 RX ORDER — GLUCAGON 1 MG/ML
1 KIT INJECTION PRN
Status: DISCONTINUED | OUTPATIENT
Start: 2025-05-30 | End: 2025-05-31 | Stop reason: HOSPADM

## 2025-05-30 RX ORDER — NITROGLYCERIN 0.4 MG/1
0.4 TABLET SUBLINGUAL EVERY 5 MIN PRN
Status: DISCONTINUED | OUTPATIENT
Start: 2025-05-30 | End: 2025-05-30

## 2025-05-30 RX ORDER — TICAGRELOR 90 MG/1
90 TABLET, FILM COATED ORAL 2 TIMES DAILY
Status: DISCONTINUED | OUTPATIENT
Start: 2025-05-30 | End: 2025-05-31 | Stop reason: HOSPADM

## 2025-05-30 RX ORDER — SODIUM CHLORIDE 9 MG/ML
INJECTION, SOLUTION INTRAVENOUS CONTINUOUS PRN
Status: COMPLETED | OUTPATIENT
Start: 2025-05-30 | End: 2025-05-30

## 2025-05-30 RX ORDER — SODIUM CHLORIDE 9 MG/ML
INJECTION, SOLUTION INTRAVENOUS CONTINUOUS
Status: DISCONTINUED | OUTPATIENT
Start: 2025-05-30 | End: 2025-05-31 | Stop reason: HOSPADM

## 2025-05-30 RX ORDER — ROSUVASTATIN CALCIUM 20 MG/1
40 TABLET, COATED ORAL NIGHTLY
Status: DISCONTINUED | OUTPATIENT
Start: 2025-05-30 | End: 2025-05-31 | Stop reason: HOSPADM

## 2025-05-30 RX ORDER — MAG HYDROX/ALUMINUM HYD/SIMETH 400-400-40
1 SUSPENSION, ORAL (FINAL DOSE FORM) ORAL DAILY
Status: DISCONTINUED | OUTPATIENT
Start: 2025-05-30 | End: 2025-05-30 | Stop reason: RX

## 2025-05-30 RX ORDER — TROSPIUM CHLORIDE 20 MG/1
20 TABLET, FILM COATED ORAL NIGHTLY
Status: DISCONTINUED | OUTPATIENT
Start: 2025-05-30 | End: 2025-05-31 | Stop reason: HOSPADM

## 2025-05-30 RX ADMIN — ALPRAZOLAM 0.25 MG: 0.25 TABLET ORAL at 20:26

## 2025-05-30 RX ADMIN — SODIUM CHLORIDE: 9 INJECTION, SOLUTION INTRAVENOUS at 16:10

## 2025-05-30 RX ADMIN — HEPARIN SODIUM 2000 UNITS: 5000 INJECTION, SOLUTION INTRAVENOUS; SUBCUTANEOUS at 13:01

## 2025-05-30 RX ADMIN — TICAGRELOR 90 MG: 90 TABLET ORAL at 20:26

## 2025-05-30 RX ADMIN — ROSUVASTATIN CALCIUM 40 MG: 20 TABLET, FILM COATED ORAL at 20:50

## 2025-05-30 RX ADMIN — FENTANYL CITRATE 50 MCG: 50 INJECTION, SOLUTION INTRAMUSCULAR; INTRAVENOUS at 12:26

## 2025-05-30 RX ADMIN — TROSPIUM CHLORIDE 20 MG: 20 TABLET, FILM COATED ORAL at 20:51

## 2025-05-30 RX ADMIN — CARVEDILOL 3.12 MG: 3.12 TABLET, FILM COATED ORAL at 21:51

## 2025-05-30 RX ADMIN — FENTANYL CITRATE 50 MCG: 50 INJECTION, SOLUTION INTRAMUSCULAR; INTRAVENOUS at 13:06

## 2025-05-30 RX ADMIN — Medication 300 MCG: at 13:21

## 2025-05-30 RX ADMIN — SODIUM CHLORIDE 400 ML/HR: 9 INJECTION, SOLUTION INTRAVENOUS at 13:27

## 2025-05-30 RX ADMIN — HEPARIN SODIUM 5000 UNITS: 5000 INJECTION, SOLUTION INTRAVENOUS; SUBCUTANEOUS at 12:43

## 2025-05-30 RX ADMIN — FENTANYL CITRATE 25 MCG: 50 INJECTION, SOLUTION INTRAMUSCULAR; INTRAVENOUS at 13:27

## 2025-05-30 RX ADMIN — SODIUM CHLORIDE: 0.9 INJECTION, SOLUTION INTRAVENOUS at 16:13

## 2025-05-30 RX ADMIN — FENTANYL CITRATE 25 MCG: 50 INJECTION, SOLUTION INTRAMUSCULAR; INTRAVENOUS at 12:49

## 2025-05-30 RX ADMIN — IOPAMIDOL 125 ML: 612 INJECTION, SOLUTION INTRAVENOUS at 13:34

## 2025-05-30 RX ADMIN — SODIUM CHLORIDE: 0.9 INJECTION, SOLUTION INTRAVENOUS at 09:09

## 2025-05-30 ASSESSMENT — PAIN - FUNCTIONAL ASSESSMENT
PAIN_FUNCTIONAL_ASSESSMENT: NONE - DENIES PAIN

## 2025-05-30 NOTE — PROGRESS NOTES
1227 Report from Martin MCQUEEN.  1229 Access to right femoral artery with use of sonosite.   1304 Angioplasty of left SFA with Menlo 4 x 120 balloon.  1308 Angioplasty of left SFA with IN.PACT 5 x 250 balloon.  1313 , Dr. Alarcon aware.  1316 Angioplasty of left SFA with IN.PACT 6 x 80 balloon.  1321 300 mcg of nitro given intra-arterial by Dr. Alarcon.  1327 Procedure completed; patient tolerated well.   1337 , pressure bag attached to 6 fr sheath.   1344 Bacitracin oint, gauze, and op site to right femoral site; area soft to touch with no bleeding noted.   1349 Patient on bed; comfort ensured. Right femoral dressing remains dry and intact with area soft.   1351 Patient taken to 2E15 via bed with family at bedside. Right femoral dressing remains dry and intact with area soft. Pt alert and oriented x3; follows commands. Skin pink, warm, and dry. Respirations easy, regular, and nonlabored. Bedside report given to Yue MCQUEEN.

## 2025-05-30 NOTE — PROGRESS NOTES
0844 Patient admitted to 2E15  Ambulatory for angiogram.  Patient NPO. Patient accompanied by significant other.  Vital signs obtained.   Assessment and data collection intiated.   Oriented to room.  Policies and procedures for 2E explained.   All questions answered with no further questions at this time.   Fall prevention and safety precautions discussed with patient.       0545 Care plan reviewed with patient and significant other.  Patient and significant other verbalize understanding of the plan of care and contribute to goal setting.     1130 Dr Alarcon in to see patient, IV rate to 100 ml per hours, states he received perfect serve message.   1137 Called IR spoke with Shivani, let her know about Versed intolerance, nausea and vomiting, she will let Dr Alarcon know. (Patient states she thinks she could take versed)    1149 To radiology per bed, stable condition.     1400 Care taken over from radiology, right groin stable .RT groin shealth intact to IV bag and infusion bag, flushed hourly.  Patient reinstructed on bedrest, instructed to keep legs straight, not to cross legs, not to lift head off of pillow, not to laugh hard, if coughs to guard site with hand, voices understanding.    1410 IV bolus complete, IV returned to 100 ml per hour.   1507 , Laureen notified in IR.   1609   1636 Laureen from IR up to pull line, line pull explained.  1638 121/44 6Fr. Sheath line pulled, manual pressure applied, patient   tolerating procedure well.   1640 115/48   1642 114/52  1443 104/59  1645 100/64  1651 91/46  1657 100/58, manual pressure released, patient tolerated well, site dressed with quickclot, folded 4x4 and opsite,106/40     1708 states rt groin hurting, small hematoma felt, pressure applied to site  1716 106/47 IR returned to hold pressure.   1726 105/31  1729 pressure released, site bruised, patient denies pain, states some soreness.       1736 Chem 73  Supper given to patient.     1743 Report called to  Elisabeth on 8A, patient going to 8A 34.   Groin bruised but soft, stable.     1820 to 8A34 per bed, stable condition. Belongings and family with patient.

## 2025-05-30 NOTE — PROCEDURES
[address] [address]                         CARDIAC CATHETERIZATION      PATIENT NAME: ARTEM BROWN          : 1945  MED REC NO: [id number]                       ROOM: [facility]  ACCOUNT NO: [id number]                       ADMIT DATE: 2025  PROVIDER: Tawny Alarcon MD      DATE OF PROCEDURE: 2025    SURGEON:  Tawny Alarcon MD    REFERRING PHYSICIAN:  Tawny Alarcon MD    INDICATIONS FOR PROCEDURE:  This is a pleasant 80-year-old female patient with extensive past medical history that includes coronary artery disease, myocardial infarction, prior PCI and stenting, as well as peripheral arterial disease.  The patient was noted to have progressively worsening peripheral arterial disease with evidence for bilateral chronic total occlusion of superficial femoral arteries based on prior vascular studies.  The patient reported having worsening symptoms including pain, numbness and tingling that lately have occurred at rest.  Symptoms are lifestyle limiting.  The most recent vascular study showed worsening arterial brachial index that was 0.38 on the right and 0.37 on the left, consistent with severe decreased BISHOP and severe peripheral arterial disease.  Risks, benefits, indications, and alternatives of the procedure were discussed with the patient.  She was agreeable to the plan and agreed to proceed with the procedures.    PROCEDURES PERFORMED:    1. Abdominal aortogram.  2. Selective angiogram of the left lower extremity.  3. Selective angiogram of the right lower extremity.  4. Successful angioplasty using drug-coated balloon of chronically occluded superficial femoral artery on the left side.    DESCRIPTION OF PROCEDURE/INTERVENTION DETAILS:  After informed consent, the patient was brought to the interventional radiology suite.  She was prepped and draped in sterile fashion.  2% lidocaine was injected in the skin and  subcutaneous tissue overlying the right groin area.  Under ultrasound and fluoroscopy guidance and utilizing micropuncture kit, access was obtained in the right common femoral artery.  5-Bangladeshi sheath was inserted and was flushed with normal saline.  Selective peripheral angiogram of the right lower extremity was performed.  I then advanced a 5-Bangladeshi VCF catheter to the distal abdominal aorta.  Angiogram of the abdominal aorta and pelvic iliac vessels was performed.  I then advanced the 5-Bangladeshi VCF catheter to the common femoral artery on the left side.  Selective angiogram of the left lower extremity was performed.  The sheath was exchanged to a 6-Bangladeshi Cecilio sheath over the [place] Advantage wire.  Heparin was given.  Using the Gl[place] Advantage wire that was advanced over the 0.035 Trailblazer microcatheter, I managed to cross the  segment.  The catheter was advanced over the wire to the distal SFA.  The wire was removed and injection was done with angiogram to confirm intraluminal positioning.  Intraluminal positioning was confirmed.  The wire was reintroduced.  I then proceeded with angioplasty.  First, a 4 mm x 120 mm [place] balloon was utilized.  I then proceeded with drug-coated balloon angioplasty first using a 5 mm x 250 mm IN.PACT Admiral balloon followed by a 6 mm x 80 mm IN.PACT Admiral balloon in the proximal segment.  The full length of the diseased segment was treated.  Nitroglycerin was given.  Repeat angiogram revealed 10% to 20% residual stenosis with straight line flow to the foot.  No significant residual stenosis.  No complications including no flow-limiting dissection, distal embolization, or perforation.    MEDICATIONS:  See MAR.    FINDINGS:  Abdominal aortogram:  Distal abdominal aorta is patent.  Right and left internal, external, and common iliac arteries are patent.    Selective angiogram of the right lower extremity:  Chronic total occlusion of the SFA was noted.  Profunda femoris is

## 2025-05-30 NOTE — POST SEDATION
[facility]  Sedation/Analgesia Post Sedation Record    Pt Name: Iliana Bejarano  Account number: [id number]  MRN: [id number]  YOB: 1945  Procedure Performed By: Twany Alarcon MD MD   Primary Care Physician: Davis Coppola MD  Date: 5/30/2025    POST-PROCEDURE    Physicians/Assistants: Tawny Alarcon MD MD     Procedure Performed:Peripheral Angiogram/Intervention      Sedation/Anesthesia: Versed/ Fentanyl and 2% xylocaine local anesthesia.      Estimated Blood Loss: < 50 ml.     Specimens Removed: None         Disposition of Specimen: N/A        Complications: No Immediate Complications.       Post-procedure Diagnosis/Findings:       Chronic total occlusion () of the left SFA, s/p successful PTA (DCB)  Residual  of right SFA     Recommendations:  Admit for observation post PTA   Access site care   Bed rest for 4 hours post sheath removal    Medical management and risk factor modification  Continue ASA 81 mg PO daily   Brilinta 90 mg po BID  Will benefit from adding Xarelto 2.5 mg po BID, may start in AM  Statin therapy, continue Crestor   Staged PTA of right SFA in few weeks (schedule on 6/23/2025)  Encourage walking and exercise   Future surveillance BISHOP evaluation     Above findings and plan of care were discussed with patient and her family, questions were answered, agreeable with plan.       Tawny Alarcon MD, FACC, [facility]   Electronically signed 5/30/2025 at 1:43 PM  Interventional Cardiology

## 2025-05-30 NOTE — H&P
Formerly named [facility]  Sedation/Analgesia History & Physical    Pt Name: Iliana Bejarano  Account number: [id number]  MRN: [id number]  YOB: 1945  Provider Performing Procedure: Tawny Alarcon MD MD  Referring Provider: Tawny Alarcon MD   Primary Care Physician: Davis Coppola MD  Date: 5/30/2025    PRE-PROCEDURE    Code Status: FULL CODE  Brief History/Pre-Procedure Diagnosis:   Severe peripheral arterial disease, worsening    BISHOP 0.38, 0.37  Consent: : I have discussed with the patient risks, benefits, and alternatives (and relevant risks, benefits, and side effects related to alternatives or not receiving care), and likelihood of the success.   The patient and/or representative appear to understand and agree to proceed.  The discussion encompasses risks, benefits, and side effects related to the alternatives and the risks related to not receiving the proposed care, treatment, and services.     The indication, risks and benefits of the procedure and possible therapeutic consequences and alternatives were discussed with the patient. The patient was given the opportunity to ask questions and to have them answered to his/her satisfaction. Risks of the procedure include but are not limited to the following: Bleeding, hematoma including retroperitoneal hemmorhage, infection, pain and discomfort, injury to the aorta and other blood vessels, rhythm disturbance, low blood pressure, myocardial infarction, stroke, kidney damage/failure, myocardial perforation, allergic reactions to sedatives/contrast material, loss of pulse/vascular compromise requiring surgery, aneurysm/pseudoaneurysm formation, possible loss of a limb/hand/leg, needing blood transfusion, requiring emergent open heart surgery or emergent coronary intervention, the need for intubation/respiratory support, the requirement for defibrillation/cardioversion, and death. Alternatives to and omission of the suggested procedure were

## 2025-05-30 NOTE — PROGRESS NOTES
1150 Patient received in IR for arteriogram procedure; family remained on 2E.  1200 This procedure has been fully reviewed with the patient and written informed consent has been obtained.  1210 Patient prepped for procedure.  1222 Dr. Alarcon in; spoke to patient and assessment obtained.  1225 Procedure started with Dr. Alarcon.  1227 Report to Krystle MCQUEEN.

## 2025-05-31 ENCOUNTER — APPOINTMENT (OUTPATIENT)
Age: 80
End: 2025-05-31
Attending: INTERNAL MEDICINE
Payer: MEDICARE

## 2025-05-31 VITALS
DIASTOLIC BLOOD PRESSURE: 55 MMHG | RESPIRATION RATE: 15 BRPM | WEIGHT: 154.54 LBS | HEIGHT: 63 IN | SYSTOLIC BLOOD PRESSURE: 109 MMHG | TEMPERATURE: 99.4 F | OXYGEN SATURATION: 94 % | BODY MASS INDEX: 27.38 KG/M2 | HEART RATE: 58 BPM

## 2025-05-31 LAB
ANION GAP SERPL CALC-SCNC: 10 MEQ/L (ref 8–16)
BUN SERPL-MCNC: 17 MG/DL (ref 8–23)
CALCIUM SERPL-MCNC: 8.1 MG/DL (ref 8.8–10.2)
CHLORIDE SERPL-SCNC: 104 MEQ/L (ref 98–111)
CO2 SERPL-SCNC: 22 MEQ/L (ref 22–29)
CREAT SERPL-MCNC: 1.1 MG/DL (ref 0.5–0.9)
DEPRECATED RDW RBC AUTO: 47.8 FL (ref 35–45)
ECHO BSA: 1.77 M2
ERYTHROCYTE [DISTWIDTH] IN BLOOD BY AUTOMATED COUNT: 13.7 % (ref 11.5–14.5)
GFR SERPL CREATININE-BSD FRML MDRD: 51 ML/MIN/1.73M2
GLUCOSE BLD STRIP.AUTO-MCNC: 84 MG/DL (ref 70–108)
GLUCOSE SERPL-MCNC: 96 MG/DL (ref 74–109)
HCT VFR BLD AUTO: 35.9 % (ref 37–47)
HGB BLD-MCNC: 11.9 GM/DL (ref 12–16)
MCH RBC QN AUTO: 31.5 PG (ref 26–33)
MCHC RBC AUTO-ENTMCNC: 33.1 GM/DL (ref 32.2–35.5)
MCV RBC AUTO: 95 FL (ref 81–99)
PLATELET # BLD AUTO: 129 THOU/MM3 (ref 130–400)
PMV BLD AUTO: 10.6 FL (ref 9.4–12.4)
POTASSIUM SERPL-SCNC: 4.1 MEQ/L (ref 3.5–5.2)
RBC # BLD AUTO: 3.78 MILL/MM3 (ref 4.2–5.4)
SODIUM SERPL-SCNC: 136 MEQ/L (ref 135–145)
WBC # BLD AUTO: 7.8 THOU/MM3 (ref 4.8–10.8)

## 2025-05-31 PROCEDURE — 99232 SBSQ HOSP IP/OBS MODERATE 35: CPT | Performed by: STUDENT IN AN ORGANIZED HEALTH CARE EDUCATION/TRAINING PROGRAM

## 2025-05-31 PROCEDURE — 6370000000 HC RX 637 (ALT 250 FOR IP): Performed by: INTERNAL MEDICINE

## 2025-05-31 PROCEDURE — 36415 COLL VENOUS BLD VENIPUNCTURE: CPT

## 2025-05-31 PROCEDURE — 80048 BASIC METABOLIC PNL TOTAL CA: CPT

## 2025-05-31 PROCEDURE — 85027 COMPLETE CBC AUTOMATED: CPT

## 2025-05-31 PROCEDURE — 6370000000 HC RX 637 (ALT 250 FOR IP): Performed by: STUDENT IN AN ORGANIZED HEALTH CARE EDUCATION/TRAINING PROGRAM

## 2025-05-31 PROCEDURE — 93242 EXT ECG>48HR<7D RECORDING: CPT

## 2025-05-31 PROCEDURE — 2580000003 HC RX 258: Performed by: INTERNAL MEDICINE

## 2025-05-31 PROCEDURE — 82948 REAGENT STRIP/BLOOD GLUCOSE: CPT

## 2025-05-31 PROCEDURE — G0378 HOSPITAL OBSERVATION PER HR: HCPCS

## 2025-05-31 RX ORDER — RIVAROXABAN 2.5 MG/1
2.5 TABLET, FILM COATED ORAL 2 TIMES DAILY WITH MEALS
Status: DISCONTINUED | OUTPATIENT
Start: 2025-05-31 | End: 2025-05-31 | Stop reason: HOSPADM

## 2025-05-31 RX ORDER — RIVAROXABAN 2.5 MG/1
2.5 TABLET, FILM COATED ORAL 2 TIMES DAILY WITH MEALS
Qty: 60 TABLET | Refills: 3 | Status: SHIPPED | OUTPATIENT
Start: 2025-05-31

## 2025-05-31 RX ADMIN — ASPIRIN 81 MG: 81 TABLET, COATED ORAL at 07:52

## 2025-05-31 RX ADMIN — CALCIUM POLYCARBOPHIL 625 MG: 625 TABLET, FILM COATED ORAL at 07:53

## 2025-05-31 RX ADMIN — SODIUM CHLORIDE: 9 INJECTION, SOLUTION INTRAVENOUS at 04:25

## 2025-05-31 RX ADMIN — PANTOPRAZOLE SODIUM 40 MG: 40 TABLET, DELAYED RELEASE ORAL at 06:19

## 2025-05-31 RX ADMIN — RIVAROXABAN 2.5 MG: 2.5 TABLET, FILM COATED ORAL at 10:48

## 2025-05-31 RX ADMIN — TICAGRELOR 90 MG: 90 TABLET ORAL at 07:52

## 2025-05-31 RX ADMIN — CARVEDILOL 3.12 MG: 3.12 TABLET, FILM COATED ORAL at 07:52

## 2025-05-31 RX ADMIN — GLIPIZIDE 5 MG: 5 TABLET ORAL at 06:19

## 2025-05-31 RX ADMIN — FOLIC ACID 1 MG: 1 TABLET ORAL at 07:52

## 2025-05-31 RX ADMIN — EMPAGLIFLOZIN 10 MG: 10 TABLET, FILM COATED ORAL at 07:53

## 2025-05-31 RX ADMIN — ALPRAZOLAM 0.25 MG: 0.25 TABLET ORAL at 07:52

## 2025-05-31 RX ADMIN — ACETAMINOPHEN 650 MG: 325 TABLET ORAL at 07:52

## 2025-05-31 ASSESSMENT — PAIN - FUNCTIONAL ASSESSMENT: PAIN_FUNCTIONAL_ASSESSMENT: NONE - DENIES PAIN

## 2025-05-31 NOTE — PROGRESS NOTES
Cardiology Progress Note      Patient:  Iliana Bejarano  YOB: 1945  MRN: [id number]   Acct: [id number]  Admit Date:  5/30/2025  Primary Cardiologist: Tawny Alarcon MD    Patient presented for OP elective peripheral angiogram, s/p angiplasty with DCB of left SFA, residual R SFA    Subjective (Events in last 24 hours):   Pt awake, alert. NAD. Denies cp or sob, no swelling. D/w patient about importance of brilinta and asa for recent heart stents and now DCB to leg, will start xarelto. D/w patient about cardiac rehab will reach out. Recommend this. They understand to monitor and watch cath site for any new or worsening pain/swelling/etc and return to ED if experiencing any. They will f/u in 1-3 weeks for staged procedure.     Right femoral-moderate ecchymosis, mildtender, no edema, NVI  Objective:   BP (!) 109/55   Pulse 58   Temp (!) 100.5 °F (38.1 °C) (Oral)   Resp 15   Ht 1.6 m (5' 3\")   Wt 70.1 kg (154 lb 8.7 oz)   SpO2 94%   BMI 27.38 kg/m²        TELEMETRY: nsr/sinus sarah with frequent pac/pvc    Physical Exam:  General Appearance: alert and oriented to person, place and time, in no acute distress  Cardiovascular: normal rate, irregular rhythm, normal S1 and S2, no murmurs, rubs, clicks, or gallops, distal pulses intact, no carotid bruits, no JVD  Pulmonary/Chest: clear to auscultation bilaterally- no wheezes, rales or rhonchi, normal air movement, no respiratory distress  Abdomen: soft, non-tender, non-distended, normal bowel sounds, no masses   Extremities: no cyanosis, clubbing or edema, pulse   Skin: warm and dry, no rash or erythema  Neurological: alert, oriented, normal speech, no focal findings or movement disorder noted    Medications:    sodium chloride flush  5-40 mL IntraVENous 2 times per day    [Held by provider] sacubitril-valsartan  1 tablet Oral BID    [Held by provider] spironolactone  25 mg Oral Daily    [Held by provider] Vitamin D  1,000 Units Oral Daily    [Held by

## 2025-05-31 NOTE — PROGRESS NOTES
Dianelys out of rosalva, contacted [facility]. They are able to fill medication for patient. One month supply

## 2025-06-02 ENCOUNTER — TELEPHONE (OUTPATIENT)
Dept: CARDIOLOGY CLINIC | Age: 80
End: 2025-06-02

## 2025-06-02 DIAGNOSIS — I21.4 NSTEMI (NON-ST ELEVATED MYOCARDIAL INFARCTION) (HCC): ICD-10-CM

## 2025-06-02 DIAGNOSIS — I70.212 ATHEROSCLEROSIS OF NATIVE ARTERY OF LEFT LOWER EXTREMITY WITH INTERMITTENT CLAUDICATION: Primary | ICD-10-CM

## 2025-06-02 NOTE — TELEPHONE ENCOUNTER
Pt left msg requesting information about upcoming procedure    Reviewed chart, pt to be schedule for Staged angioplasty of the right SFA per peripheral angiogram recommendations 05-30-25     Request order and date preferred

## 2025-06-02 NOTE — TELEPHONE ENCOUNTER
IR returned call, 11:00am not available,   scheduled at 12:00pm, arrive 8:00am for prehydration  Hold xarelto 2 days prior, last dose 06-20-25    Call to pt, date, time and instructions reviewed with pt

## 2025-06-03 ENCOUNTER — TELEPHONE (OUTPATIENT)
Dept: CARDIOLOGY CLINIC | Age: 80
End: 2025-06-03

## 2025-06-03 NOTE — TELEPHONE ENCOUNTER
Pt called stating she had a peripheral angiogram on 5/31 and wants instructions on how to remove the tape.   I advised her to  the shower and let water get the tape off and to gently remove tape.   I offered her to come into office and have a nurse remove but she states she will try this and let us know if she has any issues doing that.

## 2025-06-03 NOTE — TELEPHONE ENCOUNTER
PROCEDURE: Periphral Angio Stent of PTA    DATE OF SERVICE: 6/23/25    SERVICE LOCATION: [facility]    CPT CODE: 03926    PHYSICIAN:   DR WRAY       DATE PRIOR AUTH SUBMITTED: 6/3/2025    STATUS:  PENDING.      CASE NUMBER: [id number] or [id number]    AUTH NUMBER:     VALID:

## 2025-06-04 NOTE — TELEPHONE ENCOUNTER
Followed up with patient this morning and she hasn't removed dressing yet.   Advised patient to remove tape today, I will follow back up with patient later this afternoon.

## 2025-06-06 ENCOUNTER — TELEPHONE (OUTPATIENT)
Dept: CARDIOLOGY CLINIC | Age: 80
End: 2025-06-06

## 2025-06-06 NOTE — TELEPHONE ENCOUNTER
Patients Staged PTA was denied by insurance.  Kayley agreed to do a Peer to Peer.  Dr Gonzalez will call her on 6/6/25 at 2:45.  Kayley notified

## 2025-06-10 ENCOUNTER — HOSPITAL ENCOUNTER (OUTPATIENT)
Age: 80
Discharge: HOME | End: 2025-06-10
Payer: MEDICARE

## 2025-06-10 ENCOUNTER — OFFICE VISIT (OUTPATIENT)
Dept: CARDIOLOGY CLINIC | Age: 80
End: 2025-06-10

## 2025-06-10 VITALS
DIASTOLIC BLOOD PRESSURE: 70 MMHG | WEIGHT: 154.25 LBS | OXYGEN SATURATION: 99 % | SYSTOLIC BLOOD PRESSURE: 118 MMHG | HEART RATE: 53 BPM | HEIGHT: 63 IN | BODY MASS INDEX: 27.33 KG/M2

## 2025-06-10 DIAGNOSIS — I25.5 ISCHEMIC CARDIOMYOPATHY: ICD-10-CM

## 2025-06-10 DIAGNOSIS — Z87.898 HISTORY OF BRADYCARDIA: ICD-10-CM

## 2025-06-10 DIAGNOSIS — I25.10 CORONARY ARTERY DISEASE INVOLVING NATIVE CORONARY ARTERY OF NATIVE HEART WITHOUT ANGINA PECTORIS: ICD-10-CM

## 2025-06-10 DIAGNOSIS — Z98.61 H/O PERCUTANEOUS TRANSLUMINAL CORONARY ANGIOPLASTY: ICD-10-CM

## 2025-06-10 DIAGNOSIS — I50.20 HFREF (HEART FAILURE WITH REDUCED EJECTION FRACTION) (HCC): ICD-10-CM

## 2025-06-10 DIAGNOSIS — I73.9 PAD (PERIPHERAL ARTERY DISEASE): ICD-10-CM

## 2025-06-10 DIAGNOSIS — I10 PRIMARY HYPERTENSION: ICD-10-CM

## 2025-06-10 DIAGNOSIS — I50.20 HFREF (HEART FAILURE WITH REDUCED EJECTION FRACTION) (HCC): Primary | ICD-10-CM

## 2025-06-10 LAB
ANION GAP SERPL CALC-SCNC: 13 MEQ/L (ref 8–16)
BUN SERPL-MCNC: 24 MG/DL (ref 8–23)
CALCIUM SERPL-MCNC: 9.8 MG/DL (ref 8.8–10.2)
CHLORIDE SERPL-SCNC: 101 MEQ/L (ref 98–111)
CO2 SERPL-SCNC: 24 MEQ/L (ref 22–29)
CREAT SERPL-MCNC: 1.1 MG/DL (ref 0.5–0.9)
GFR SERPL CREATININE-BSD FRML MDRD: 51 ML/MIN/1.73M2
GLUCOSE SERPL-MCNC: 89 MG/DL (ref 74–109)
NT-PROBNP SERPL IA-MCNC: 49 PG/ML (ref 0–449)
POTASSIUM SERPL-SCNC: 4.2 MEQ/L (ref 3.5–5.2)
SODIUM SERPL-SCNC: 138 MEQ/L (ref 135–145)

## 2025-06-10 PROCEDURE — 80048 BASIC METABOLIC PNL TOTAL CA: CPT

## 2025-06-10 PROCEDURE — 36415 COLL VENOUS BLD VENIPUNCTURE: CPT

## 2025-06-10 PROCEDURE — 83880 ASSAY OF NATRIURETIC PEPTIDE: CPT

## 2025-06-10 RX ORDER — HYDROCHLOROTHIAZIDE 25 MG/1
25 TABLET ORAL DAILY PRN
Qty: 30 TABLET | Refills: 3 | Status: ON HOLD | OUTPATIENT
Start: 2025-06-10 | End: 2025-06-23 | Stop reason: ALTCHOICE

## 2025-06-10 NOTE — PROGRESS NOTES
Iliana Bejarano (:  1945) is a 80 y.o. female, Established patient, here for NEW CHF CLINIC evaluation.     Primary Cardiologist: Dr. Alarcon    Congestive Heart Failure (New to provider)    HPI:   Last seen by Dr. Alarcon on 2025 for post admission follow-up for NSTEMI. Rising Troponins and echo noted EF  35-40%, mild aortic stenosis. On 2025, patient underwent PCI of LAD. Patient denies chest pain, shortness of breath, dyspnea on exertion. Cozaar and Coreg stopped - changed to ENTRESTO. HCTZ and Aldactone continued. Jardiance, ASA, Brilinta, Crestor, CHF clinic referral.     PTA SFA L LE: 2025  Successful angioplasty using drug-coated balloon of chronically occluded superficial femoral artery on the left side.          INITIAL CHF CLINIC VISIT ON 6/10/2025:  Assessment & Plan  HrEF 35 - 40% echo 2025  ICM  NSTEMI  CAD S/p PCI LAD 2025   Hx aSx Bradycardia   Mild AS  HLD  HTN  HLD  DM  Hx CVA      1. Congestive Heart Failure:  - Ejection fraction currently at 35-40%, below the optimal range of 60-65%  - Likely due to recent NSTEMI and subsequent PCI to the LAD  - Goal: Improve cardiac pump function through medication management and cardiac rehabilitation  - Start cardiac rehab on 07/15/2025  - Continue medications: Entresto, hydrochlorothiazide, Aldactone, Jardiance, aspirin, Brilinta, Crestor  - Risks, benefits, and alternatives of treatment discussed, including the importance of medication adherence and cardiac rehab to strengthen the heart and improve cardiovascular health    2. Peripheral Artery Disease:  - Underwent successful angioplasty with a drug-coated balloon for occluded SFA artery on the left side on 2025  - Staged procedure planned for the right side  - Participate in PAD rehab to improve blood flow in legs  - Walking and exercise to develop vessels in legs, benefiting overall cardiovascular health  - Combination of cardiac rehab and PAD rehab discussed to enhance

## 2025-06-10 NOTE — PROGRESS NOTES
New patient here for fu peripheral angiogram    Pt continues with weakness in left  leg is improving, numbness in foot area, numbness at times in right leg,     Pt concerned about groin would like checked, is having black stool

## 2025-06-10 NOTE — PATIENT INSTRUCTIONS
Only use the Hydrochlorothiazide if weight up by 3 pounds OVN or more than 5 pounds in a week. Keep a log and vadim on the log the days you took the Hydrochlorothiazide    Make sure drinking adequately to the 2L fluid restriction.     Continue other current medications as prescribed.    Stay active. Start cardiac rehab as scheduled.     Eat heart healthy diet.     Office hours:   Mon-Thurs 8-4:30  Friday 8-12  Phone: [phone]    Continue:  Continue current medications  Daily weights and record  Fluid restriction of 2 Liters per day  Limit sodium in diet to around 2945-8146 mg/day  Monitor BP    Call the Heart Failure Clinic for any of the following symptoms:   Weight gain of 3 pounds in 1 day or 5 pounds in 1 week  Increased shortness of breath  Shortness of breath while laying down  Chest pain  Swelling in feet, ankles or legs  Bloating in abdomen  Fatigue       Follow-up with your PCP as scheduled.    Follow-up with Dr. Alarcon on 6/23/2025 as scheduled or sooner if need for the right leg procedure.     Follow-up with DINA Craig CNP in CHF clinic in 3 months.

## 2025-06-18 ENCOUNTER — RESULTS FOLLOW-UP (OUTPATIENT)
Dept: CARDIOLOGY CLINIC | Age: 80
End: 2025-06-18

## 2025-06-18 ENCOUNTER — TELEPHONE (OUTPATIENT)
Dept: CARDIOLOGY CLINIC | Age: 80
End: 2025-06-18

## 2025-06-18 DIAGNOSIS — K92.1 BLOOD IN STOOL: Primary | ICD-10-CM

## 2025-06-18 LAB — ECHO BSA: 1.77 M2

## 2025-06-18 NOTE — TELEPHONE ENCOUNTER
Patient called  Recent peripheral angioplasty 5/30/25  Complaining of dark black stool with foul odor  GI upset  Instructed patient to come to ER r/o GI bleed.  Currently taking brilinta, xarelto, and aspirin  Patient agrees- will come to this hospital.

## 2025-06-18 NOTE — TELEPHONE ENCOUNTER
Patient called back and left voicemail........  Reports she quit taking xarelto  Just had normal BM  Not going to ER  Staged PTA Monday 6/18/25

## 2025-06-19 NOTE — TELEPHONE ENCOUNTER
Needs CBC ASAP  Ok to stay off Xarelto     Advise patient that based on her description of symptoms, GI bleeding can not be ruled out. Advise patient that this condition can be Life-threatening and ER evaluation is warranted !

## 2025-06-19 NOTE — TELEPHONE ENCOUNTER
Pt notified and she said she is feeling fine, and her stools again today were fine, so she is not going to ER. Pt will get lab work done in the morning here.

## 2025-06-20 ENCOUNTER — HOSPITAL ENCOUNTER (OUTPATIENT)
Age: 80
Discharge: HOME | End: 2025-06-20
Payer: MEDICARE

## 2025-06-20 ENCOUNTER — PREP FOR PROCEDURE (OUTPATIENT)
Dept: CARDIOLOGY | Age: 80
End: 2025-06-20

## 2025-06-20 DIAGNOSIS — K92.1 BLOOD IN STOOL: ICD-10-CM

## 2025-06-20 LAB
DEPRECATED RDW RBC AUTO: 53.7 FL (ref 35–45)
ERYTHROCYTE [DISTWIDTH] IN BLOOD BY AUTOMATED COUNT: 14.6 % (ref 11.5–14.5)
HCT VFR BLD AUTO: 35 % (ref 37–47)
HGB BLD-MCNC: 11.4 GM/DL (ref 12–16)
MCH RBC QN AUTO: 32.6 PG (ref 26–33)
MCHC RBC AUTO-ENTMCNC: 32.6 GM/DL (ref 32.2–35.5)
MCV RBC AUTO: 100 FL (ref 81–99)
PLATELET # BLD AUTO: 204 THOU/MM3 (ref 130–400)
PMV BLD AUTO: 10.5 FL (ref 9.4–12.4)
RBC # BLD AUTO: 3.5 MILL/MM3 (ref 4.2–5.4)
WBC # BLD AUTO: 8.4 THOU/MM3 (ref 4.8–10.8)

## 2025-06-20 PROCEDURE — 36415 COLL VENOUS BLD VENIPUNCTURE: CPT

## 2025-06-20 PROCEDURE — 85027 COMPLETE CBC AUTOMATED: CPT

## 2025-06-20 RX ORDER — SODIUM CHLORIDE 0.9 % (FLUSH) 0.9 %
5-40 SYRINGE (ML) INJECTION PRN
Status: CANCELLED | OUTPATIENT
Start: 2025-06-20

## 2025-06-20 RX ORDER — DIPHENHYDRAMINE HYDROCHLORIDE 50 MG/ML
50 INJECTION, SOLUTION INTRAMUSCULAR; INTRAVENOUS ONCE
Status: CANCELLED | OUTPATIENT
Start: 2025-06-20 | End: 2025-06-20

## 2025-06-20 RX ORDER — HYDROCORTISONE SODIUM SUCCINATE 100 MG/2ML
200 INJECTION INTRAMUSCULAR; INTRAVENOUS ONCE
Status: CANCELLED | OUTPATIENT
Start: 2025-06-20 | End: 2025-06-20

## 2025-06-20 RX ORDER — ASPIRIN 325 MG
325 TABLET ORAL ONCE
Status: CANCELLED | OUTPATIENT
Start: 2025-06-20 | End: 2025-06-20

## 2025-06-20 RX ORDER — SODIUM CHLORIDE 0.9 % (FLUSH) 0.9 %
5-40 SYRINGE (ML) INJECTION EVERY 12 HOURS SCHEDULED
Status: CANCELLED | OUTPATIENT
Start: 2025-06-20

## 2025-06-20 RX ORDER — SODIUM CHLORIDE 9 MG/ML
INJECTION, SOLUTION INTRAVENOUS PRN
Status: CANCELLED | OUTPATIENT
Start: 2025-06-20

## 2025-06-20 RX ORDER — NITROGLYCERIN 0.4 MG/1
0.4 TABLET SUBLINGUAL EVERY 5 MIN PRN
Status: CANCELLED | OUTPATIENT
Start: 2025-06-20

## 2025-06-20 NOTE — TELEPHONE ENCOUNTER
Noted  Stable Hemoglobin \"blood count\"  Patient should inform office of recurrent similar complaints   Give ER warning signs (for recurrent complaints)  Will repeat labs on Monday prior to procedure. Will proceed if patient has no signs of bleeding and Hemoglobin is stable

## 2025-06-23 ENCOUNTER — HOSPITAL ENCOUNTER (OUTPATIENT)
Dept: INTERVENTIONAL RADIOLOGY/VASCULAR | Age: 80
Discharge: HOME | End: 2025-06-24
Attending: INTERNAL MEDICINE | Admitting: INTERNAL MEDICINE
Payer: MEDICARE

## 2025-06-23 DIAGNOSIS — I70.212 ATHEROSCLEROSIS OF NATIVE ARTERY OF LEFT LOWER EXTREMITY WITH INTERMITTENT CLAUDICATION: ICD-10-CM

## 2025-06-23 DIAGNOSIS — I21.4 NSTEMI (NON-ST ELEVATED MYOCARDIAL INFARCTION) (HCC): ICD-10-CM

## 2025-06-23 PROBLEM — Z98.61 POST PTCA: Status: ACTIVE | Noted: 2025-06-23

## 2025-06-23 LAB
ABO GROUP BLD: NORMAL
ANION GAP SERPL CALC-SCNC: 13 MEQ/L (ref 8–16)
APTT PPP: 31.9 SECONDS (ref 22–38)
BUN SERPL-MCNC: 22 MG/DL (ref 8–23)
CALCIUM SERPL-MCNC: 9 MG/DL (ref 8.8–10.2)
CHLORIDE SERPL-SCNC: 102 MEQ/L (ref 98–111)
CO2 SERPL-SCNC: 20 MEQ/L (ref 22–29)
CREAT SERPL-MCNC: 1.3 MG/DL (ref 0.5–0.9)
DEPRECATED RDW RBC AUTO: 51.6 FL (ref 35–45)
ERYTHROCYTE [DISTWIDTH] IN BLOOD BY AUTOMATED COUNT: 14.2 % (ref 11.5–14.5)
GFR SERPL CREATININE-BSD FRML MDRD: 42 ML/MIN/1.73M2
GLUCOSE BLD STRIP.AUTO-MCNC: 109 MG/DL (ref 70–108)
GLUCOSE BLD STRIP.AUTO-MCNC: 118 MG/DL (ref 70–108)
GLUCOSE SERPL-MCNC: 129 MG/DL (ref 74–109)
HCT VFR BLD AUTO: 33.8 % (ref 37–47)
HGB BLD-MCNC: 11.2 GM/DL (ref 12–16)
IAT IGG-SP REAG SERPL QL: NORMAL
INR PPP: 1.55 (ref 0.85–1.13)
KCT BLD-ACNC: 216 SECONDS (ref 1–150)
MCH RBC QN AUTO: 32.6 PG (ref 26–33)
MCHC RBC AUTO-ENTMCNC: 33.1 GM/DL (ref 32.2–35.5)
MCV RBC AUTO: 98.3 FL (ref 81–99)
PLATELET # BLD AUTO: 190 THOU/MM3 (ref 130–400)
PMV BLD AUTO: 10.4 FL (ref 9.4–12.4)
POTASSIUM SERPL-SCNC: 4.2 MEQ/L (ref 3.5–5.2)
PROTHROMBIN TIME: 17.8 SECONDS (ref 10–13.5)
RBC # BLD AUTO: 3.44 MILL/MM3 (ref 4.2–5.4)
RH BLD: NORMAL
SODIUM SERPL-SCNC: 135 MEQ/L (ref 135–145)
WBC # BLD AUTO: 9.4 THOU/MM3 (ref 4.8–10.8)

## 2025-06-23 PROCEDURE — 6370000000 HC RX 637 (ALT 250 FOR IP): Performed by: INTERNAL MEDICINE

## 2025-06-23 PROCEDURE — 37224 HC FEM POP TERRITORY PLASTY: CPT

## 2025-06-23 PROCEDURE — 2580000003 HC RX 258: Performed by: STUDENT IN AN ORGANIZED HEALTH CARE EDUCATION/TRAINING PROGRAM

## 2025-06-23 PROCEDURE — 6360000002 HC RX W HCPCS: Performed by: INTERNAL MEDICINE

## 2025-06-23 PROCEDURE — 85347 COAGULATION TIME ACTIVATED: CPT

## 2025-06-23 PROCEDURE — 86900 BLOOD TYPING SEROLOGIC ABO: CPT

## 2025-06-23 PROCEDURE — 85610 PROTHROMBIN TIME: CPT

## 2025-06-23 PROCEDURE — 2580000003 HC RX 258: Performed by: INTERNAL MEDICINE

## 2025-06-23 PROCEDURE — 82948 REAGENT STRIP/BLOOD GLUCOSE: CPT

## 2025-06-23 PROCEDURE — 2500000003 HC RX 250 WO HCPCS: Performed by: INTERNAL MEDICINE

## 2025-06-23 PROCEDURE — 80048 BASIC METABOLIC PNL TOTAL CA: CPT

## 2025-06-23 PROCEDURE — 86885 COOMBS TEST INDIRECT QUAL: CPT

## 2025-06-23 PROCEDURE — 76937 US GUIDE VASCULAR ACCESS: CPT

## 2025-06-23 PROCEDURE — 75710 ARTERY X-RAYS ARM/LEG: CPT

## 2025-06-23 PROCEDURE — 7100000011 HC PHASE II RECOVERY - ADDTL 15 MIN: Performed by: INTERNAL MEDICINE

## 2025-06-23 PROCEDURE — C2623 CATH, TRANSLUMIN, DRUG-COAT: HCPCS

## 2025-06-23 PROCEDURE — 7100000010 HC PHASE II RECOVERY - FIRST 15 MIN: Performed by: INTERNAL MEDICINE

## 2025-06-23 PROCEDURE — 85730 THROMBOPLASTIN TIME PARTIAL: CPT

## 2025-06-23 PROCEDURE — 36415 COLL VENOUS BLD VENIPUNCTURE: CPT

## 2025-06-23 PROCEDURE — 85027 COMPLETE CBC AUTOMATED: CPT

## 2025-06-23 PROCEDURE — 6360000004 HC RX CONTRAST MEDICATION: Performed by: INTERNAL MEDICINE

## 2025-06-23 PROCEDURE — 86901 BLOOD TYPING SEROLOGIC RH(D): CPT

## 2025-06-23 PROCEDURE — 2500000003 HC RX 250 WO HCPCS: Performed by: STUDENT IN AN ORGANIZED HEALTH CARE EDUCATION/TRAINING PROGRAM

## 2025-06-23 RX ORDER — GLIPIZIDE 10 MG/1
10 TABLET ORAL
Status: DISCONTINUED | OUTPATIENT
Start: 2025-06-24 | End: 2025-06-24 | Stop reason: HOSPADM

## 2025-06-23 RX ORDER — DEXTROSE MONOHYDRATE 100 MG/ML
INJECTION, SOLUTION INTRAVENOUS CONTINUOUS PRN
Status: DISCONTINUED | OUTPATIENT
Start: 2025-06-23 | End: 2025-06-24 | Stop reason: HOSPADM

## 2025-06-23 RX ORDER — SODIUM CHLORIDE 9 MG/ML
INJECTION, SOLUTION INTRAVENOUS CONTINUOUS
Status: DISCONTINUED | OUTPATIENT
Start: 2025-06-23 | End: 2025-06-24 | Stop reason: HOSPADM

## 2025-06-23 RX ORDER — NITROGLYCERIN 0.4 MG/1
0.4 TABLET SUBLINGUAL EVERY 5 MIN PRN
Status: DISCONTINUED | OUTPATIENT
Start: 2025-06-23 | End: 2025-06-23

## 2025-06-23 RX ORDER — FENTANYL CITRATE 50 UG/ML
INJECTION, SOLUTION INTRAMUSCULAR; INTRAVENOUS PRN
Status: COMPLETED | OUTPATIENT
Start: 2025-06-23 | End: 2025-06-23

## 2025-06-23 RX ORDER — SODIUM CHLORIDE 0.9 % (FLUSH) 0.9 %
5-40 SYRINGE (ML) INJECTION PRN
Status: DISCONTINUED | OUTPATIENT
Start: 2025-06-23 | End: 2025-06-24 | Stop reason: HOSPADM

## 2025-06-23 RX ORDER — SODIUM CHLORIDE 0.9 % (FLUSH) 0.9 %
5-40 SYRINGE (ML) INJECTION EVERY 12 HOURS SCHEDULED
Status: DISCONTINUED | OUTPATIENT
Start: 2025-06-23 | End: 2025-06-24 | Stop reason: HOSPADM

## 2025-06-23 RX ORDER — DIPHENHYDRAMINE HYDROCHLORIDE 50 MG/ML
50 INJECTION, SOLUTION INTRAMUSCULAR; INTRAVENOUS ONCE
Status: DISCONTINUED | OUTPATIENT
Start: 2025-06-23 | End: 2025-06-24 | Stop reason: HOSPADM

## 2025-06-23 RX ORDER — MAG HYDROX/ALUMINUM HYD/SIMETH 400-400-40
1 SUSPENSION, ORAL (FINAL DOSE FORM) ORAL DAILY
Status: DISCONTINUED | OUTPATIENT
Start: 2025-06-23 | End: 2025-06-23 | Stop reason: CLARIF

## 2025-06-23 RX ORDER — PANTOPRAZOLE SODIUM 40 MG/1
40 TABLET, DELAYED RELEASE ORAL
Status: DISCONTINUED | OUTPATIENT
Start: 2025-06-24 | End: 2025-06-24 | Stop reason: HOSPADM

## 2025-06-23 RX ORDER — IOPAMIDOL 612 MG/ML
INJECTION, SOLUTION INTRAVASCULAR PRN
Status: COMPLETED | OUTPATIENT
Start: 2025-06-23 | End: 2025-06-23

## 2025-06-23 RX ORDER — ACETAMINOPHEN 325 MG/1
650 TABLET ORAL EVERY 4 HOURS PRN
Status: DISCONTINUED | OUTPATIENT
Start: 2025-06-23 | End: 2025-06-24 | Stop reason: HOSPADM

## 2025-06-23 RX ORDER — MULTIVITAMIN WITH IRON
125 TABLET ORAL DAILY
Status: DISCONTINUED | OUTPATIENT
Start: 2025-06-23 | End: 2025-06-24 | Stop reason: HOSPADM

## 2025-06-23 RX ORDER — HYDROCORTISONE SODIUM SUCCINATE 100 MG/2ML
200 INJECTION INTRAMUSCULAR; INTRAVENOUS ONCE
Status: DISCONTINUED | OUTPATIENT
Start: 2025-06-23 | End: 2025-06-24 | Stop reason: HOSPADM

## 2025-06-23 RX ORDER — VITAMIN B COMPLEX
1000 TABLET ORAL DAILY
Status: DISCONTINUED | OUTPATIENT
Start: 2025-06-23 | End: 2025-06-24 | Stop reason: HOSPADM

## 2025-06-23 RX ORDER — SODIUM CHLORIDE 9 MG/ML
INJECTION, SOLUTION INTRAVENOUS CONTINUOUS PRN
Status: COMPLETED | OUTPATIENT
Start: 2025-06-23 | End: 2025-06-23

## 2025-06-23 RX ORDER — CALCIUM CARBONATE 500 MG/1
1 TABLET, CHEWABLE ORAL DAILY
Status: DISCONTINUED | OUTPATIENT
Start: 2025-06-23 | End: 2025-06-24 | Stop reason: HOSPADM

## 2025-06-23 RX ORDER — FENTANYL CITRATE 50 UG/ML
25 INJECTION, SOLUTION INTRAMUSCULAR; INTRAVENOUS ONCE
Status: COMPLETED | OUTPATIENT
Start: 2025-06-23 | End: 2025-06-23

## 2025-06-23 RX ORDER — ASPIRIN 81 MG/1
81 TABLET ORAL DAILY
Status: DISCONTINUED | OUTPATIENT
Start: 2025-06-24 | End: 2025-06-24 | Stop reason: HOSPADM

## 2025-06-23 RX ORDER — HEPARIN SODIUM 1000 [USP'U]/ML
INJECTION, SOLUTION INTRAVENOUS; SUBCUTANEOUS PRN
Status: COMPLETED | OUTPATIENT
Start: 2025-06-23 | End: 2025-06-23

## 2025-06-23 RX ORDER — LIDOCAINE HYDROCHLORIDE 20 MG/ML
INJECTION, SOLUTION EPIDURAL; INFILTRATION; INTRACAUDAL; PERINEURAL PRN
Status: COMPLETED | OUTPATIENT
Start: 2025-06-23 | End: 2025-06-23

## 2025-06-23 RX ORDER — TICAGRELOR 90 MG/1
90 TABLET, FILM COATED ORAL 2 TIMES DAILY
Status: DISCONTINUED | OUTPATIENT
Start: 2025-06-23 | End: 2025-06-24 | Stop reason: HOSPADM

## 2025-06-23 RX ORDER — ASPIRIN 325 MG
325 TABLET ORAL ONCE
Status: DISCONTINUED | OUTPATIENT
Start: 2025-06-23 | End: 2025-06-23

## 2025-06-23 RX ORDER — NITROGLYCERIN 0.4 MG/1
0.4 TABLET SUBLINGUAL EVERY 5 MIN PRN
Status: DISCONTINUED | OUTPATIENT
Start: 2025-06-23 | End: 2025-06-24 | Stop reason: HOSPADM

## 2025-06-23 RX ORDER — ALPRAZOLAM 0.25 MG
0.25 TABLET ORAL 2 TIMES DAILY
Status: DISCONTINUED | OUTPATIENT
Start: 2025-06-23 | End: 2025-06-24 | Stop reason: HOSPADM

## 2025-06-23 RX ORDER — SODIUM CHLORIDE 9 MG/ML
INJECTION, SOLUTION INTRAVENOUS PRN
Status: DISCONTINUED | OUTPATIENT
Start: 2025-06-23 | End: 2025-06-24 | Stop reason: HOSPADM

## 2025-06-23 RX ORDER — FOLIC ACID 1 MG/1
1 TABLET ORAL DAILY
Status: DISCONTINUED | OUTPATIENT
Start: 2025-06-23 | End: 2025-06-24 | Stop reason: HOSPADM

## 2025-06-23 RX ORDER — GLUCAGON 1 MG/ML
1 KIT INJECTION PRN
Status: DISCONTINUED | OUTPATIENT
Start: 2025-06-23 | End: 2025-06-24 | Stop reason: HOSPADM

## 2025-06-23 RX ORDER — SPIRONOLACTONE 25 MG/1
25 TABLET ORAL DAILY
Status: DISCONTINUED | OUTPATIENT
Start: 2025-06-23 | End: 2025-06-24 | Stop reason: HOSPADM

## 2025-06-23 RX ORDER — HYDROCODONE BITARTRATE AND ACETAMINOPHEN 5; 325 MG/1; MG/1
1 TABLET ORAL EVERY 4 HOURS PRN
Refills: 0 | Status: DISCONTINUED | OUTPATIENT
Start: 2025-06-23 | End: 2025-06-24 | Stop reason: HOSPADM

## 2025-06-23 RX ORDER — CARVEDILOL 3.12 MG/1
3.12 TABLET ORAL 2 TIMES DAILY WITH MEALS
Status: DISCONTINUED | OUTPATIENT
Start: 2025-06-24 | End: 2025-06-24 | Stop reason: HOSPADM

## 2025-06-23 RX ORDER — ROSUVASTATIN CALCIUM 20 MG/1
40 TABLET, COATED ORAL NIGHTLY
Status: DISCONTINUED | OUTPATIENT
Start: 2025-06-23 | End: 2025-06-24 | Stop reason: HOSPADM

## 2025-06-23 RX ADMIN — SODIUM CHLORIDE: 0.9 INJECTION, SOLUTION INTRAVENOUS at 12:13

## 2025-06-23 RX ADMIN — FENTANYL CITRATE 25 MCG: 50 INJECTION, SOLUTION INTRAMUSCULAR; INTRAVENOUS at 15:24

## 2025-06-23 RX ADMIN — HYDROCODONE BITARTRATE AND ACETAMINOPHEN 1 TABLET: 5; 325 TABLET ORAL at 18:59

## 2025-06-23 RX ADMIN — EMPAGLIFLOZIN 10 MG: 10 TABLET, FILM COATED ORAL at 19:01

## 2025-06-23 RX ADMIN — SODIUM CHLORIDE 250 ML: 9 INJECTION, SOLUTION INTRAVENOUS at 12:55

## 2025-06-23 RX ADMIN — IOPAMIDOL 95 ML: 612 INJECTION, SOLUTION INTRAVENOUS at 13:28

## 2025-06-23 RX ADMIN — HYDROCODONE BITARTRATE AND ACETAMINOPHEN 1 TABLET: 5; 325 TABLET ORAL at 22:52

## 2025-06-23 RX ADMIN — LIDOCAINE HYDROCHLORIDE 5 ML: 20 INJECTION, SOLUTION EPIDURAL; INFILTRATION; INTRACAUDAL; PERINEURAL at 13:29

## 2025-06-23 RX ADMIN — Medication 1000 UNITS: at 19:01

## 2025-06-23 RX ADMIN — SODIUM CHLORIDE: 0.9 INJECTION, SOLUTION INTRAVENOUS at 08:11

## 2025-06-23 RX ADMIN — HEPARIN SODIUM 5000 UNITS: 1000 INJECTION INTRAVENOUS; SUBCUTANEOUS at 12:43

## 2025-06-23 RX ADMIN — SODIUM CHLORIDE: 0.9 INJECTION, SOLUTION INTRAVENOUS at 15:28

## 2025-06-23 RX ADMIN — ALPRAZOLAM 0.25 MG: 0.25 TABLET ORAL at 21:59

## 2025-06-23 RX ADMIN — FENTANYL CITRATE 25 MCG: 50 INJECTION, SOLUTION INTRAMUSCULAR; INTRAVENOUS at 12:39

## 2025-06-23 RX ADMIN — FENTANYL CITRATE 50 MCG: 50 INJECTION, SOLUTION INTRAMUSCULAR; INTRAVENOUS at 12:31

## 2025-06-23 RX ADMIN — Medication 300 MCG: at 13:00

## 2025-06-23 RX ADMIN — FOLIC ACID 1 MG: 1 TABLET ORAL at 19:01

## 2025-06-23 RX ADMIN — SODIUM CHLORIDE, PRESERVATIVE FREE 10 ML: 5 INJECTION INTRAVENOUS at 21:59

## 2025-06-23 RX ADMIN — TICAGRELOR 90 MG: 90 TABLET ORAL at 21:59

## 2025-06-23 RX ADMIN — ANTACID TABLETS 500 MG: 500 TABLET, CHEWABLE ORAL at 19:00

## 2025-06-23 RX ADMIN — SODIUM CHLORIDE, PRESERVATIVE FREE 10 ML: 5 INJECTION INTRAVENOUS at 22:02

## 2025-06-23 ASSESSMENT — PAIN DESCRIPTION - DESCRIPTORS
DESCRIPTORS: CRAMPING
DESCRIPTORS: ACHING
DESCRIPTORS: CRAMPING
DESCRIPTORS: CRAMPING

## 2025-06-23 ASSESSMENT — PAIN SCALES - GENERAL
PAINLEVEL_OUTOF10: 7
PAINLEVEL_OUTOF10: 2
PAINLEVEL_OUTOF10: 7
PAINLEVEL_OUTOF10: 8
PAINLEVEL_OUTOF10: 8

## 2025-06-23 ASSESSMENT — PAIN SCALES - WONG BAKER
WONGBAKER_NUMERICALRESPONSE: HURTS WHOLE LOT
WONGBAKER_NUMERICALRESPONSE: HURTS A LITTLE BIT

## 2025-06-23 ASSESSMENT — PAIN DESCRIPTION - ORIENTATION
ORIENTATION: LEFT;RIGHT
ORIENTATION: RIGHT;UPPER
ORIENTATION: LEFT

## 2025-06-23 ASSESSMENT — PAIN DESCRIPTION - LOCATION
LOCATION: GROIN
LOCATION: GROIN;LEG
LOCATION: GROIN
LOCATION: GROIN;LEG

## 2025-06-23 NOTE — FLOWSHEET NOTE
06/23/25 1440   Treatment Team Notification   Reason for Communication Review case   Name of Team Member Notified [facility]   Treatment Team Role Attending Provider   Method of Communication Call   Response See orders   Notification Time 1440     Notified that patient is having pain in left groin at insertion site, and down right leg, \"cramping\".  Orders received for fentanyl x1 dose and oxycodone.

## 2025-06-23 NOTE — PLAN OF CARE
Problem: Pain  Goal: Verbalizes/displays adequate comfort level or baseline comfort level  Outcome: Progressing     Problem: Chronic Conditions and Co-morbidities  Goal: Patient's chronic conditions and co-morbidity symptoms are monitored and maintained or improved  Outcome: Progressing     Problem: Discharge Planning  Goal: Discharge to home or other facility with appropriate resources  Outcome: Progressing     Problem: ABCDS Injury Assessment  Goal: Absence of physical injury  Outcome: Progressing

## 2025-06-23 NOTE — PROGRESS NOTES
1157 Pt in specials radiology for left leg angiogram with intervention. Discussed procedure with pt and pt verbalizes understanding.  1213 Moved to table and attached to monitor. IV increased to 100 ml's/hr.  1223 Left groin and right foot prepped and draped.  1230 Dr Alarcon here.  1240 5 fr sheath inserted in left femoral artery.  1242 Report to Nathalie MCQUEEN.

## 2025-06-23 NOTE — PROGRESS NOTES
1244 Report from Marleny MCQUEEN.  Upsize to 6 fr sheath.  1252 Angioplasty of the right SFA with [place] 4 x 120 balloon.   1255 Angioplasty of the right SFA with [place] 4 x 120 balloon.   1300 300mcg nitroglycerin given intra-arterial by Dr. Alarcon.  1302 Angioplasty of the right SFA IN.PACT 6 x 40 balloon.   1304 , Dr. Alarcon aware.  1309 Angioplasty of the right SFA with IN.PACT 4 x 80 balloon.   1315 Procedure completed; patient tolerated well. Angio seal device deployed to left femoral artery. Manual pressure held.  1325 Manual pressure released.  Quick clot, and op site to left femoral site; area soft to touch with no bleeding noted.   1325 Dr. Alarcon spoke to patient's family.  1334 Patient on bed; comfort ensured. Left femoral dressing remains dry and intact with area soft.   1334 Patient taken to 2E via bed with family at bedside. Left femoral dressing remains dry and intact with area soft. Pt alert and oriented x3; follows commands. Skin pink, warm, and dry. Respirations easy, regular, and nonlabored.

## 2025-06-23 NOTE — PROCEDURES
[address] [address]                         CARDIAC CATHETERIZATION      PATIENT NAME: ARTEM BROWN          : 1945  MED REC NO: [id number]                       ROOM: [facility]  ACCOUNT NO: [id number]                       ADMIT DATE: 2025  PROVIDER: Tawny Alarcon MD    PERIPHERAL ANGIOGRAM AND ANGIOPLASTY REPORT    DATE OF PROCEDURE: 2025    SURGEON:  Tawny Alarcon MD    REFERRING PHYSICIAN:  Tawny Alarcon MD      INDICATION FOR PROCEDURE:  This is a pleasant 80-year-old female patient with extensive past medical history including myocardial infarction, coronary artery disease as well as known history of peripheral artery disease.  The patient has been evaluated for progressively worsening symptomatic peripheral arterial disease with vascular studies showing critical PAD with BISHOP of 0.38 on the right and 0.37 on the left.    PROCEDURES PERFORMED:    1. Peripheral angiogram of the right lower extremity.  2. Successful angioplasty of a chronically occluded right superficial femoral artery.    DESCRIPTION OF PROCEDURE/INTERVENTION DETAILS:  After informed consent, the patient was brought to the Interventional Radiology suite.  She was prepped and draped in a sterile fashion.  2% lidocaine was injected in the skin and subcutaneous tissue overlying the left common femoral artery.  A 5-Nigerien sheath was inserted and flushed with saline.  A 5-Nigerien VCF catheter was advanced to the distal abdominal aorta, and then using the catheter, I was able to cross over and advance [place] Advantage wire.  The sheath was then exchanged to a 6-Nigerien Cecilio sheath.  I was able to cross the chronically occluded segment of the right superficial femoral artery using the Glide Advantage wire advanced over the 0.035 trailblazer microcatheter.  After re-entry, I did advance the trailblazer microcatheter and wire was removed.  Dye was

## 2025-06-23 NOTE — H&P
[facility] [place]  Sedation/Analgesia History & Physical    Pt Name: Iliana Bejarano  Account number: [id number]  MRN: [id number]  YOB: 1945  Provider Performing Procedure: Tawny Alarcon MD MD  Referring Provider: Tawny Alarcon MD   Primary Care Physician: Davis Coppola MD  Date: 6/23/2025    PRE-PROCEDURE    Code Status: FULL CODE  Brief History/Pre-Procedure Diagnosis:   Severe symptomatic peripheral arterial disease, worsening             BISHOP 0.38, 0.37 indicative of critical PAD  Consent: : I have discussed with the patient risks, benefits, and alternatives (and relevant risks, benefits, and side effects related to alternatives or not receiving care), and likelihood of the success.   The patient and/or representative appear to understand and agree to proceed.  The discussion encompasses risks, benefits, and side effects related to the alternatives and the risks related to not receiving the proposed care, treatment, and services.     The indication, risks and benefits of the procedure and possible therapeutic consequences and alternatives were discussed with the patient. The patient was given the opportunity to ask questions and to have them answered to his/her satisfaction. Risks of the procedure include but are not limited to the following: Bleeding, hematoma including retroperitoneal hemmorhage, infection, pain and discomfort, injury to the aorta and other blood vessels, rhythm disturbance, low blood pressure, myocardial infarction, stroke, kidney damage/failure, myocardial perforation, allergic reactions to sedatives/contrast material, loss of pulse/vascular compromise requiring surgery, aneurysm/pseudoaneurysm formation, possible loss of a limb/hand/leg, needing blood transfusion, requiring emergent open heart surgery or emergent coronary intervention, the need for intubation/respiratory support, the requirement for defibrillation/cardioversion, and death. Alternatives to and

## 2025-06-23 NOTE — PROGRESS NOTES
TRANSFER - OUT REPORT:    Verbal report given to Shirley MCQUEEN(name) on Iliana Bejarano being transferred to (unit) for routine progression of patient care       Report consisted of patient's Situation, Background, Assessment and   Recommendations(SBAR).     Information from the following report(s) Nurse Handoff Report, Surgery Report, and MAR was reviewed with the receiving nurse.    Opportunity for questions and clarification was provided.      Patient transported with:   Monitor

## 2025-06-24 VITALS
TEMPERATURE: 97.7 F | DIASTOLIC BLOOD PRESSURE: 80 MMHG | WEIGHT: 155.2 LBS | RESPIRATION RATE: 16 BRPM | HEART RATE: 51 BPM | SYSTOLIC BLOOD PRESSURE: 148 MMHG | HEIGHT: 63 IN | OXYGEN SATURATION: 99 % | BODY MASS INDEX: 27.5 KG/M2

## 2025-06-24 LAB
ANION GAP SERPL CALC-SCNC: 10 MEQ/L (ref 8–16)
BUN SERPL-MCNC: 16 MG/DL (ref 8–23)
CALCIUM SERPL-MCNC: 8.6 MG/DL (ref 8.8–10.2)
CHLORIDE SERPL-SCNC: 105 MEQ/L (ref 98–111)
CO2 SERPL-SCNC: 21 MEQ/L (ref 22–29)
CREAT SERPL-MCNC: 1.1 MG/DL (ref 0.5–0.9)
DEPRECATED RDW RBC AUTO: 50.5 FL (ref 35–45)
ERYTHROCYTE [DISTWIDTH] IN BLOOD BY AUTOMATED COUNT: 14.2 % (ref 11.5–14.5)
GFR SERPL CREATININE-BSD FRML MDRD: 51 ML/MIN/1.73M2
GLUCOSE BLD STRIP.AUTO-MCNC: 98 MG/DL (ref 70–108)
GLUCOSE SERPL-MCNC: 99 MG/DL (ref 74–109)
HCT VFR BLD AUTO: 29.3 % (ref 37–47)
HGB BLD-MCNC: 9.7 GM/DL (ref 12–16)
MCH RBC QN AUTO: 32.2 PG (ref 26–33)
MCHC RBC AUTO-ENTMCNC: 33.1 GM/DL (ref 32.2–35.5)
MCV RBC AUTO: 97.3 FL (ref 81–99)
PLATELET # BLD AUTO: 154 THOU/MM3 (ref 130–400)
PMV BLD AUTO: 10.5 FL (ref 9.4–12.4)
POTASSIUM SERPL-SCNC: 4.1 MEQ/L (ref 3.5–5.2)
RBC # BLD AUTO: 3.01 MILL/MM3 (ref 4.2–5.4)
SODIUM SERPL-SCNC: 136 MEQ/L (ref 135–145)
WBC # BLD AUTO: 9 THOU/MM3 (ref 4.8–10.8)

## 2025-06-24 PROCEDURE — 80048 BASIC METABOLIC PNL TOTAL CA: CPT

## 2025-06-24 PROCEDURE — 85027 COMPLETE CBC AUTOMATED: CPT

## 2025-06-24 PROCEDURE — 2500000003 HC RX 250 WO HCPCS: Performed by: STUDENT IN AN ORGANIZED HEALTH CARE EDUCATION/TRAINING PROGRAM

## 2025-06-24 PROCEDURE — 6370000000 HC RX 637 (ALT 250 FOR IP): Performed by: INTERNAL MEDICINE

## 2025-06-24 PROCEDURE — 82948 REAGENT STRIP/BLOOD GLUCOSE: CPT

## 2025-06-24 PROCEDURE — 36415 COLL VENOUS BLD VENIPUNCTURE: CPT

## 2025-06-24 PROCEDURE — APPNB30 APP NON BILLABLE TIME 0-30 MINS: Performed by: PHYSICIAN ASSISTANT

## 2025-06-24 RX ADMIN — CARVEDILOL 3.12 MG: 3.12 TABLET, FILM COATED ORAL at 09:08

## 2025-06-24 RX ADMIN — ALPRAZOLAM 0.25 MG: 0.25 TABLET ORAL at 09:09

## 2025-06-24 RX ADMIN — TICAGRELOR 90 MG: 90 TABLET ORAL at 09:08

## 2025-06-24 RX ADMIN — EMPAGLIFLOZIN 10 MG: 10 TABLET, FILM COATED ORAL at 09:08

## 2025-06-24 RX ADMIN — HYDROCODONE BITARTRATE AND ACETAMINOPHEN 1 TABLET: 5; 325 TABLET ORAL at 09:15

## 2025-06-24 RX ADMIN — GLIPIZIDE 10 MG: 10 TABLET ORAL at 09:08

## 2025-06-24 RX ADMIN — HYDROCODONE BITARTRATE AND ACETAMINOPHEN 1 TABLET: 5; 325 TABLET ORAL at 02:47

## 2025-06-24 RX ADMIN — Medication 1000 UNITS: at 09:09

## 2025-06-24 RX ADMIN — ANTACID TABLETS 500 MG: 500 TABLET, CHEWABLE ORAL at 09:09

## 2025-06-24 RX ADMIN — ASPIRIN 81 MG: 81 TABLET, COATED ORAL at 09:08

## 2025-06-24 RX ADMIN — SODIUM CHLORIDE, PRESERVATIVE FREE 10 ML: 5 INJECTION INTRAVENOUS at 09:09

## 2025-06-24 RX ADMIN — FOLIC ACID 1 MG: 1 TABLET ORAL at 09:08

## 2025-06-24 ASSESSMENT — PAIN SCALES - WONG BAKER: WONGBAKER_NUMERICALRESPONSE: HURTS LITTLE MORE

## 2025-06-24 ASSESSMENT — PAIN SCALES - GENERAL
PAINLEVEL_OUTOF10: 4
PAINLEVEL_OUTOF10: 7

## 2025-06-24 ASSESSMENT — PAIN DESCRIPTION - DESCRIPTORS: DESCRIPTORS: ACHING;DISCOMFORT

## 2025-06-24 ASSESSMENT — PAIN DESCRIPTION - LOCATION: LOCATION: ABDOMEN

## 2025-06-24 ASSESSMENT — PAIN DESCRIPTION - ORIENTATION: ORIENTATION: LEFT

## 2025-06-24 NOTE — DISCHARGE INSTRUCTIONS
Normal Observation: You may or may not experience these.    Soreness or tenderness that may last a few weeks.    Possible bruising that could last a few weeks and up to one month.   Formation of a small lump (dime to quarter size) that should last only a few weeks.    Care of your incision  You may shower 24 hours after the procedure. Wet the dressing thoroughly and gently remove the bandage from the hospital during showering. It is easier to remove this way.             Gently clean your site daily using soap and water while standing in the shower. Dry thoroughly.   Do not apply powders or lotions to the site for 2 weeks.   Keep the site clean and dry to prevent infection.    Do not sit in a bathtub or a pool of water for 7 days.    Inspect the site daily.    Activity   You may resume normal activity in 2 days, including driving, letting pain be your     guide.   Limit lifting over 5 pounds (half gallon of milk) to one week or until site heals.  Limit vigorous activity (contact sports) to two weeks time.  You will be able to return to work in 1-3 days.    Call our office immediately if you experience any of the following…  Significant bleeding does not stop after 10 minutes of applying firm pressure directly over incision.   Increased swelling of groin or leg.   Unusual pain at groin or down that leg.   Signs of infection: redness, warmth to touch, drainage, poorly healing incision, fever, or chills.    Follow up dr burr 2 weeks  Follow up PCP 1 week

## 2025-06-24 NOTE — PROGRESS NOTES
Patient received booklet, 'How to care for your heart after coronary artery disease'. Discussed how to take care of the incision/puncture site, the importance of participating in Cardiac Rehab and their hours of operation, heart healthy diet, and risk factor modification (obesity, smoking, high BP, high LDL and cholesterol,stress).   Pt states understanding, awake, alert, vss at time of discharge.

## 2025-06-24 NOTE — PLAN OF CARE
Problem: Pain  Goal: Verbalizes/displays adequate comfort level or baseline comfort level  6/23/2025 2016 by Gianna Dorman RN  Outcome: Progressing  6/23/2025 1707 by Heidi Morrissey RN  Outcome: Progressing     Problem: Chronic Conditions and Co-morbidities  Goal: Patient's chronic conditions and co-morbidity symptoms are monitored and maintained or improved  6/23/2025 2016 by Gianna Dorman RN  Outcome: Progressing  6/23/2025 1707 by Heidi Morrissey RN  Outcome: Progressing     Problem: Discharge Planning  Goal: Discharge to home or other facility with appropriate resources  6/23/2025 2016 by Gianna Dorman RN  Outcome: Progressing  6/23/2025 1707 by Heidi Morrissey RN  Outcome: Progressing     Problem: ABCDS Injury Assessment  Goal: Absence of physical injury  6/23/2025 2016 by Gianna Dorman RN  Outcome: Progressing  6/23/2025 1707 by Heidi Morrissey RN  Outcome: Progressing

## 2025-06-24 NOTE — PROGRESS NOTES
Cardiology Progress Note      Patient:  Iliana Bejarano  YOB: 1945  MRN: [id number]   Acct: [id number]  Admit Date:  6/23/2025  Primary Cardiologist: dr burr    Here for OP peripheral angiogram    Subjective (Events in last 24 hours): pt awake and alert.  NAD.  No cp or sob. No edema or orthopnea.  Ambulating without issues  On RA      Objective:   BP (!) 142/45   Pulse 63   Temp 98 °F (36.7 °C) (Oral)   Resp 16   Ht 1.6 m (5' 3\")   Wt 70.4 kg (155 lb 3.3 oz)   SpO2 98%   BMI 27.49 kg/m²        TELEMETRY: NSR with PVCs    Physical Exam:  General Appearance: alert and oriented to person, place and time, in no acute distress  Cardiovascular: normal rate, regular rhythm, normal S1 and S2, +murmur  Pulmonary/Chest: clear to auscultation bilaterally- no wheezes, rales or rhonchi, normal air movement, no respiratory distress  Abdomen: soft, non-tender, non-distended, normal bowel sounds, no masses Extremities: no cyanosis, clubbing or edema, +DP, +PT bilaterally   Skin: warm and dry, no rash or erythema  Head: normocephalic and atraumatic  Eyes: pupils equal, round, and reactive to light  Neck: supple and non-tender without mass, no thyromegaly   Musculoskeletal: normal range of motion, no joint swelling, deformity or tenderness  Neurological: alert, oriented, normal speech, no focal findings or movement disorder noted  Left groin - no hematoma, +DP, +PT    Medications:    sodium chloride flush  5-40 mL IntraVENous 2 times per day    diphenhydrAMINE  50 mg IntraVENous Once    hydrocortisone sodium succinate PF  200 mg IntraVENous Once    ALPRAZolam  0.25 mg Oral BID    aspirin  81 mg Oral Daily    calcium carbonate  1 tablet Oral Daily    carvedilol  3.125 mg Oral BID WC    [Held by provider] Dulaglutide  3 mg SubCUTAneous Weekly    empagliflozin  10 mg Oral Daily    folic acid  1 mg Oral Daily    glipiZIDE  10 mg Oral QAM AC    [Held by provider] magnesium  125 mg Oral Daily    [Held by

## 2025-07-08 ENCOUNTER — OFFICE VISIT (OUTPATIENT)
Dept: CARDIOLOGY CLINIC | Age: 80
End: 2025-07-08
Payer: MEDICARE

## 2025-07-08 VITALS
BODY MASS INDEX: 25.69 KG/M2 | HEART RATE: 78 BPM | WEIGHT: 145 LBS | HEIGHT: 63 IN | SYSTOLIC BLOOD PRESSURE: 117 MMHG | DIASTOLIC BLOOD PRESSURE: 69 MMHG

## 2025-07-08 DIAGNOSIS — I50.20 HFREF (HEART FAILURE WITH REDUCED EJECTION FRACTION) (HCC): ICD-10-CM

## 2025-07-08 DIAGNOSIS — I73.9 PAD (PERIPHERAL ARTERY DISEASE): Primary | ICD-10-CM

## 2025-07-08 DIAGNOSIS — Z95.820 S/P ANGIOPLASTY WITH STENT: ICD-10-CM

## 2025-07-08 DIAGNOSIS — Z98.62 S/P PERIPHERAL ARTERY ANGIOPLASTY: ICD-10-CM

## 2025-07-08 DIAGNOSIS — I25.5 ISCHEMIC CARDIOMYOPATHY: ICD-10-CM

## 2025-07-08 PROCEDURE — 3074F SYST BP LT 130 MM HG: CPT | Performed by: PHYSICIAN ASSISTANT

## 2025-07-08 PROCEDURE — 99214 OFFICE O/P EST MOD 30 MIN: CPT | Performed by: PHYSICIAN ASSISTANT

## 2025-07-08 PROCEDURE — 3078F DIAST BP <80 MM HG: CPT | Performed by: PHYSICIAN ASSISTANT

## 2025-07-08 PROCEDURE — 93000 ELECTROCARDIOGRAM COMPLETE: CPT | Performed by: PHYSICIAN ASSISTANT

## 2025-07-08 PROCEDURE — 1123F ACP DISCUSS/DSCN MKR DOCD: CPT | Performed by: PHYSICIAN ASSISTANT

## 2025-07-08 PROCEDURE — 1159F MED LIST DOCD IN RCRD: CPT | Performed by: PHYSICIAN ASSISTANT

## 2025-07-08 RX ORDER — SPIRONOLACTONE 25 MG/1
12.5 TABLET ORAL DAILY
Qty: 15 TABLET | Refills: 3 | Status: SHIPPED
Start: 2025-07-08

## 2025-07-08 NOTE — PROGRESS NOTES
[facility] [place] [facility]  [facility] CARDIOLOGY  [address]  [address]  [place] [facility] [address]  Dept: [phone]  Dept Fax: [phone]  Loc: [phone]    Chief Complaint   Patient presents with    Follow-Up from Hospital       History of Present Illness  The patient is a 80-year-old female who underwent PCI of the LAD, noted to have nonobstructive disease of the left main coronary artery, ischemic cardiomyopathy with an ejection fraction of 35 to 40%, presenting for follow-up.    Pt with history of bilateral SFA drug-coated balloon angioplasty.  She experienced more pain in one leg due to a hematoma in the artery and a plug that was inserted.She has been experiencing intermittent pain in the pit of her stomach, which sometimes subsides. The pain is described as gnawing and worsens when she wears a bra due to the pressure it exerts. She also reports nausea and loss of appetite. She takes a couple of Tylenol before bed to manage the pain and help her sleep. She has lost approximately 20 pounds since her stroke in 2020.     She was taken off Xarelto because it was causing her bleeding, resulting in black stools. She is currently on aspirin 81 mg daily, carvedilol 3.125 mg twice daily, rosuvastatin 40 mg at night, Entresto 24/26 mg twice daily, spironolactone 25 mg daily, and Brilinta 90 mg twice daily. She did not experience any issues until Xarelto was added to her regimen.       She is diabetic and takes Jardiance 10 mg daily and Trulicity. Her blood sugar levels are well-controlled, with a recent reading of 116.          Cardiologist:  Dr. Alarcon        General:   No fever, no chills, No fatigue or weight loss  Pulmonary:    No dyspnea, no wheezing  Cardiac:    Denies recent chest pain   GI:     Epigastric pain  Neuro:    No dizziness or light headedness  Musculoskeletal:  No recent active issues  Extremities:   No edema, good peripheral pulses      Past Medical History:

## 2025-07-15 ENCOUNTER — HOSPITAL ENCOUNTER (OUTPATIENT)
Dept: CARDIAC REHAB | Age: 80
Setting detail: THERAPIES SERIES
Discharge: HOME | End: 2025-07-15
Attending: INTERNAL MEDICINE
Payer: MEDICARE

## 2025-07-15 ENCOUNTER — TELEPHONE (OUTPATIENT)
Dept: CARDIOLOGY CLINIC | Age: 80
End: 2025-07-15

## 2025-07-15 PROCEDURE — G0422 INTENS CARDIAC REHAB W/EXERC: HCPCS

## 2025-07-15 PROCEDURE — G0423 INTENS CARDIAC REHAB NO EXER: HCPCS

## 2025-07-15 NOTE — PLAN OF CARE
Preparation  [] Action  [] Maintenance  [] Relapse [] Pre Contemplation  [] Contemplation  [] Preparation  [] Action  [] Maintenance  [] Relapse [] Pre Contemplation  [] Contemplation  [] Preparation  [] Action  [] Maintenance  [] Relapse [] Pre Contemplation  [] Contemplation  [] Preparation  [] Action  [] Maintenance  [] Relapse   Hypertension Hypertension Hypertension Hypertension Hypertension Hypertension   [x] Yes      [] No    Resting BP: 110/60  Peak Ex BP:118/62  Medication: coreg   Resting BP:   Peak Ex BP:  Medication Changes:  [] Yes      [] No    Additional Notes: Resting BP:   Peak Ex BP:  Medication Changes:  [] Yes      [] No    Additional Notes: Resting BP:   Peak Ex BP:  Medication Changes:  [] Yes      [] No    Additional Notes: Resting BP:   Peak Ex BP:  Medication Changes:  [] Yes      [] No    Additional Notes: Resting BP:   Peak Ex BP:  Medication Changes:  [] Yes      [] No    Additional Notes:   Lipid Management Lipid Management Lipid Management Lipid Management Lipid Management Lipid Management   HLD/DLD  [x] Yes      [] No  Date:  25  TOTAL CHOL: 108  HDL:  38  LDL:  47  TRI  Medication: crestor   Medication Changes:  [] Yes      [] No    Additional Notes:       Medication Changes:  [] Yes      [] No    Additional Notes:   Medication Changes:  [] Yes      [] No    Additional Notes:   Medication Changes:  [] Yes      [] No    Additional Notes:   Medication Changes:  [] Yes      [] No    Additional Notes:   Weight Management Weight Management Weight Management Weight Management Weight Management Weight Management   Weight: 151.6        Height: 5'3\"   BMI: 26.9     Goal:   [x] Lose weight  [] Maintain weight  [] Gain weight  *Goal weight in 30 days is 149 lbs. Weight:                Goal:   [] Lose weight  [] Maintain weight  [] Gain weight  *Goal weight in 30 days is lbs. Weight:                 Goal:   [] Lose weight  [] Maintain weight  [] Gain weight  *Goal weight in 30 days is 
none

## 2025-07-15 NOTE — PROGRESS NOTES
Video Education Report - ICR/CR  Name:  Iliana Bejarano     Date:  7/15/2025  MRN: [id number]     Session #:  1  Session Length: 45 min    Core Videos        [x]Heart Disease Risk Reduction       []Overview of Pritikin Eating Plan      []Move it          []Calorie Density         []Healthy Minds, Bodies, Hearts        []Label Reading - Part 1       []Metabolic Syndrome and Belly Fat        []How Our Thoughts Can Heal Our Hearts   []Dining Out - Part 1      []Biomechanical Limitations  []Facts on Fat        []Hypertension & Heart Disease    []Diseases of Our Time - Focusing on Diabetes   []Body Composition  []Nutrition Action Plan    []Exercise Action Plan        Comments:  Video and program orientation completed.

## 2025-07-17 VITALS — WEIGHT: 151.6 LBS | BODY MASS INDEX: 26.86 KG/M2 | HEIGHT: 63 IN

## 2025-07-17 ASSESSMENT — PATIENT HEALTH QUESTIONNAIRE - PHQ9
4. FEELING TIRED OR HAVING LITTLE ENERGY: SEVERAL DAYS
1. LITTLE INTEREST OR PLEASURE IN DOING THINGS: NOT AT ALL
3. TROUBLE FALLING OR STAYING ASLEEP: SEVERAL DAYS
SUM OF ALL RESPONSES TO PHQ QUESTIONS 1-9: 3
7. TROUBLE CONCENTRATING ON THINGS, SUCH AS READING THE NEWSPAPER OR WATCHING TELEVISION: NOT AT ALL
SUM OF ALL RESPONSES TO PHQ QUESTIONS 1-9: 3
9. THOUGHTS THAT YOU WOULD BE BETTER OFF DEAD, OR OF HURTING YOURSELF: NOT AT ALL
8. MOVING OR SPEAKING SO SLOWLY THAT OTHER PEOPLE COULD HAVE NOTICED. OR THE OPPOSITE, BEING SO FIGETY OR RESTLESS THAT YOU HAVE BEEN MOVING AROUND A LOT MORE THAN USUAL: NOT AT ALL
10. IF YOU CHECKED OFF ANY PROBLEMS, HOW DIFFICULT HAVE THESE PROBLEMS MADE IT FOR YOU TO DO YOUR WORK, TAKE CARE OF THINGS AT HOME, OR GET ALONG WITH OTHER PEOPLE: NOT DIFFICULT AT ALL
5. POOR APPETITE OR OVEREATING: SEVERAL DAYS
2. FEELING DOWN, DEPRESSED OR HOPELESS: NOT AT ALL
SUM OF ALL RESPONSES TO PHQ QUESTIONS 1-9: 3
6. FEELING BAD ABOUT YOURSELF - OR THAT YOU ARE A FAILURE OR HAVE LET YOURSELF OR YOUR FAMILY DOWN: NOT AT ALL
SUM OF ALL RESPONSES TO PHQ QUESTIONS 1-9: 3

## 2025-07-23 ENCOUNTER — HOSPITAL ENCOUNTER (OUTPATIENT)
Age: 80
Discharge: HOME | End: 2025-07-23
Payer: MEDICARE

## 2025-07-23 ENCOUNTER — HOSPITAL ENCOUNTER (OUTPATIENT)
Dept: CARDIAC REHAB | Age: 80
Setting detail: THERAPIES SERIES
Discharge: HOME | End: 2025-07-23
Payer: MEDICARE

## 2025-07-23 LAB
ANION GAP SERPL CALC-SCNC: 11 MEQ/L (ref 8–16)
BUN SERPL-MCNC: 21 MG/DL (ref 8–23)
CALCIUM SERPL-MCNC: 9.3 MG/DL (ref 8.8–10.2)
CHLORIDE SERPL-SCNC: 99 MEQ/L (ref 98–111)
CO2 SERPL-SCNC: 26 MEQ/L (ref 22–29)
CREAT SERPL-MCNC: 1.2 MG/DL (ref 0.5–0.9)
GFR SERPL CREATININE-BSD FRML MDRD: 46 ML/MIN/1.73M2
GLUCOSE SERPL-MCNC: 95 MG/DL (ref 74–109)
POTASSIUM SERPL-SCNC: 3.8 MEQ/L (ref 3.5–5.2)
SODIUM SERPL-SCNC: 136 MEQ/L (ref 135–145)

## 2025-07-23 PROCEDURE — 80048 BASIC METABOLIC PNL TOTAL CA: CPT

## 2025-07-23 PROCEDURE — G0422 INTENS CARDIAC REHAB W/EXERC: HCPCS

## 2025-07-23 PROCEDURE — 36415 COLL VENOUS BLD VENIPUNCTURE: CPT

## 2025-07-23 PROCEDURE — G0423 INTENS CARDIAC REHAB NO EXER: HCPCS

## 2025-07-23 NOTE — PROGRESS NOTES
Video Education Report - ICR/CR  Name:  Iliana Bejarano     Date:  7/23/2025  MRN: [id number]     Session #:  2  Session Length: 40 min    Core Videos        []Heart Disease Risk Reduction       [x]Overview of Pritikin Eating Plan      []Move it          []Calorie Density         []Healthy Minds, Bodies, Hearts        []Label Reading - Part 1       []Metabolic Syndrome and Belly Fat        []How Our Thoughts Can Heal Our Hearts   []Dining Out - Part 1      []Biomechanical Limitations  []Facts on Fat        []Hypertension & Heart Disease    []Diseases of Our Time - Focusing on Diabetes   []Body Composition  []Nutrition Action Plan    []Exercise Action Plan    Comments:  Video completed, group discussion

## 2025-07-25 ENCOUNTER — APPOINTMENT (OUTPATIENT)
Dept: CARDIAC REHAB | Age: 80
End: 2025-07-25
Payer: MEDICARE

## 2025-07-28 RX ORDER — TICAGRELOR 90 MG/1
90 TABLET, FILM COATED ORAL 2 TIMES DAILY
Qty: 180 TABLET | Refills: 3 | Status: SHIPPED | OUTPATIENT
Start: 2025-07-28

## 2025-07-28 RX ORDER — CARVEDILOL 3.12 MG/1
3.12 TABLET ORAL 2 TIMES DAILY WITH MEALS
Qty: 180 TABLET | Refills: 3 | Status: SHIPPED | OUTPATIENT
Start: 2025-07-28

## 2025-07-28 NOTE — TELEPHONE ENCOUNTER
Iliana J Whittington called requesting a refill on the following medications:  Requested Prescriptions     Pending Prescriptions Disp Refills    carvedilol (COREG) 3.125 MG tablet 60 tablet 3     Sig: Take 1 tablet by mouth 2 times daily (with meals)    empagliflozin (JARDIANCE) 10 MG tablet 30 tablet 3     Sig: Take 1 tablet by mouth daily     Pharmacy verified:  [facility]      Date of last visit: 07-08-25  Date of next visit (if applicable): 09-23-25

## 2025-07-30 ENCOUNTER — APPOINTMENT (OUTPATIENT)
Dept: CARDIAC REHAB | Age: 80
End: 2025-07-30
Payer: MEDICARE

## 2025-08-01 ENCOUNTER — APPOINTMENT (OUTPATIENT)
Dept: CARDIAC REHAB | Age: 80
End: 2025-08-01
Payer: MEDICARE

## 2025-08-01 ENCOUNTER — HOSPITAL ENCOUNTER (OUTPATIENT)
Dept: CARDIAC REHAB | Age: 80
Setting detail: THERAPIES SERIES
End: 2025-08-01
Payer: MEDICARE

## 2025-08-06 ENCOUNTER — HOSPITAL ENCOUNTER (OUTPATIENT)
Dept: CARDIAC REHAB | Age: 80
Setting detail: THERAPIES SERIES
Discharge: HOME | End: 2025-08-06
Payer: MEDICARE

## 2025-08-06 PROCEDURE — G0422 INTENS CARDIAC REHAB W/EXERC: HCPCS

## 2025-08-06 PROCEDURE — G0423 INTENS CARDIAC REHAB NO EXER: HCPCS

## 2025-08-08 ENCOUNTER — HOSPITAL ENCOUNTER (OUTPATIENT)
Dept: CARDIAC REHAB | Age: 80
Setting detail: THERAPIES SERIES
Discharge: HOME | End: 2025-08-08
Payer: MEDICARE

## 2025-08-08 PROCEDURE — G0422 INTENS CARDIAC REHAB W/EXERC: HCPCS

## 2025-08-08 PROCEDURE — G0423 INTENS CARDIAC REHAB NO EXER: HCPCS

## 2025-08-13 ENCOUNTER — HOSPITAL ENCOUNTER (OUTPATIENT)
Dept: CARDIAC REHAB | Age: 80
Setting detail: THERAPIES SERIES
Discharge: HOME | End: 2025-08-13
Payer: MEDICARE

## 2025-08-13 PROCEDURE — G0423 INTENS CARDIAC REHAB NO EXER: HCPCS

## 2025-08-13 PROCEDURE — G0422 INTENS CARDIAC REHAB W/EXERC: HCPCS

## 2025-08-15 ENCOUNTER — HOSPITAL ENCOUNTER (OUTPATIENT)
Dept: CARDIAC REHAB | Age: 80
Setting detail: THERAPIES SERIES
Discharge: HOME | End: 2025-08-15
Payer: MEDICARE

## 2025-08-15 PROCEDURE — G0422 INTENS CARDIAC REHAB W/EXERC: HCPCS

## 2025-08-15 PROCEDURE — G0423 INTENS CARDIAC REHAB NO EXER: HCPCS

## 2025-08-20 ENCOUNTER — APPOINTMENT (OUTPATIENT)
Dept: CARDIAC REHAB | Age: 80
End: 2025-08-20
Payer: MEDICARE

## 2025-08-22 ENCOUNTER — HOSPITAL ENCOUNTER (OUTPATIENT)
Dept: CARDIAC REHAB | Age: 80
Setting detail: THERAPIES SERIES
Discharge: HOME | End: 2025-08-22
Payer: MEDICARE

## 2025-08-22 PROCEDURE — G0423 INTENS CARDIAC REHAB NO EXER: HCPCS

## 2025-08-22 PROCEDURE — G0422 INTENS CARDIAC REHAB W/EXERC: HCPCS

## 2025-08-22 RX ORDER — SACUBITRIL AND VALSARTAN 24; 26 MG/1; MG/1
1 TABLET, FILM COATED ORAL 2 TIMES DAILY
Qty: 180 TABLET | Refills: 3 | Status: SHIPPED | OUTPATIENT
Start: 2025-08-22

## 2025-08-27 ENCOUNTER — HOSPITAL ENCOUNTER (OUTPATIENT)
Dept: CARDIAC REHAB | Age: 80
Setting detail: THERAPIES SERIES
Discharge: HOME | End: 2025-08-27
Payer: MEDICARE

## 2025-08-27 PROCEDURE — G0423 INTENS CARDIAC REHAB NO EXER: HCPCS

## 2025-08-27 PROCEDURE — G0422 INTENS CARDIAC REHAB W/EXERC: HCPCS

## 2025-08-29 ENCOUNTER — HOSPITAL ENCOUNTER (OUTPATIENT)
Dept: CARDIAC REHAB | Age: 80
Setting detail: THERAPIES SERIES
Discharge: HOME | End: 2025-08-29
Payer: MEDICARE

## 2025-08-29 PROCEDURE — G0422 INTENS CARDIAC REHAB W/EXERC: HCPCS

## 2025-08-29 PROCEDURE — G0423 INTENS CARDIAC REHAB NO EXER: HCPCS

## 2025-09-03 ENCOUNTER — HOSPITAL ENCOUNTER (OUTPATIENT)
Dept: CARDIAC REHAB | Age: 80
Setting detail: THERAPIES SERIES
Discharge: HOME | End: 2025-09-03
Payer: MEDICARE

## 2025-09-03 PROCEDURE — G0422 INTENS CARDIAC REHAB W/EXERC: HCPCS

## 2025-09-03 PROCEDURE — G0423 INTENS CARDIAC REHAB NO EXER: HCPCS

## (undated) DEVICE — CATHETER DIAG 6FR L110CM PIG CRV SZ 6 SIDE H DBL BRAID WIRE

## (undated) DEVICE — BAND COMPR L24CM REG CLR PLAS HEMSTAT EXT HK AND LOOP RETEN

## (undated) DEVICE — TUBING RIGID ANGIO L10IN 1200PSI CNTRST INJ FIX FEM LUER CONN HI

## (undated) DEVICE — RUNTHROUGH NS EXTRA FLOPPY PTCA GUIDEWIRE: Brand: RUNTHROUGH

## (undated) DEVICE — GLIDESHEATH SLENDER ACCESS KIT: Brand: GLIDESHEATH SLENDER

## (undated) DEVICE — CATHETER GUID 6FR L100CM DIA0.071IN NYL SHFT EBU3.5

## (undated) DEVICE — KIT MFLD ISOLATN NACL CNTRST PRT TBNG SPIK W/ PRSS TRNSDUC

## (undated) DEVICE — Device: Brand: NOMOLINE™ LH ADULT NASAL CO2 CANNULA WITH O2 4M

## (undated) DEVICE — KIT ANGIO W/ AT P65 PREM HND CTRL FOR CNTRST DEL ANGIOTOUCH

## (undated) DEVICE — CATHETER GUID 6FR L100CM DIA0.071IN NYL SHFT JL3.5 W/O SIDE

## (undated) DEVICE — GUIDEWIRE VASC L260CM DIA0.035IN RAD 3MM J TIP L7CM PTFE

## (undated) DEVICE — CORONARY IMAGING CATHETER: Brand: OPTICROSS™ HD

## (undated) DEVICE — CATHETER DIAG 5FR L100CM LUMN ID0.047IN JL3.5 CRV 0 SIDE H

## (undated) DEVICE — CATH BLLN ANGIO 4X8MM NC EUPHORIA RX

## (undated) DEVICE — CATH BLLN ANGIO 2X12MM SC EUPHORA RX

## (undated) DEVICE — CATHETER DIAG 5FR L100CM LUMN ID0.047IN JR4 CRV 0 SIDE H

## (undated) DEVICE — CATH BLLN ANGIO 3.50X12MM SC EUPHORA RX

## (undated) DEVICE — DRAPE KIT RAMAPR RADIATION SHIELD

## (undated) DEVICE — VALVE HEMSTAS W/ GWIRE INSRTN TOOL GRDIAN II NC

## (undated) DEVICE — Device

## (undated) DEVICE — TIBURON NEONATAL DRAPE: Brand: CONVERTORS